# Patient Record
Sex: MALE | Race: WHITE | Employment: FULL TIME
[De-identification: names, ages, dates, MRNs, and addresses within clinical notes are randomized per-mention and may not be internally consistent; named-entity substitution may affect disease eponyms.]

---

## 2017-02-07 ENCOUNTER — OFFICE VISIT (OUTPATIENT)
Dept: UROLOGY | Facility: CLINIC | Age: 57
End: 2017-02-07

## 2017-02-07 VITALS
BODY MASS INDEX: 40.4 KG/M2 | HEART RATE: 79 BPM | WEIGHT: 257.4 LBS | DIASTOLIC BLOOD PRESSURE: 74 MMHG | SYSTOLIC BLOOD PRESSURE: 130 MMHG | HEIGHT: 67 IN | TEMPERATURE: 98.3 F

## 2017-02-07 DIAGNOSIS — N42.32 ATYPICAL SMALL ACINAR PROLIFERATION OF PROSTATE: ICD-10-CM

## 2017-02-07 DIAGNOSIS — R97.20 ELEVATED PSA: Primary | ICD-10-CM

## 2017-02-07 PROCEDURE — 99213 OFFICE O/P EST LOW 20 MIN: CPT | Performed by: UROLOGY

## 2017-02-07 ASSESSMENT — ENCOUNTER SYMPTOMS
COLOR CHANGE: 0
VOMITING: 0
NAUSEA: 0
COUGH: 1
EYE PAIN: 0
SHORTNESS OF BREATH: 0
BACK PAIN: 0
EYE REDNESS: 0
ABDOMINAL PAIN: 0
WHEEZING: 0

## 2017-03-28 ENCOUNTER — APPOINTMENT (OUTPATIENT)
Dept: GENERAL RADIOLOGY | Age: 57
End: 2017-03-28
Payer: COMMERCIAL

## 2017-03-28 ENCOUNTER — HOSPITAL ENCOUNTER (EMERGENCY)
Age: 57
Discharge: HOME | End: 2017-03-28
Attending: EMERGENCY MEDICINE
Payer: COMMERCIAL

## 2017-03-28 VITALS
SYSTOLIC BLOOD PRESSURE: 156 MMHG | WEIGHT: 260 LBS | BODY MASS INDEX: 40.81 KG/M2 | OXYGEN SATURATION: 97 % | HEIGHT: 67 IN | DIASTOLIC BLOOD PRESSURE: 86 MMHG | HEART RATE: 85 BPM | RESPIRATION RATE: 16 BRPM | TEMPERATURE: 98.3 F

## 2017-03-28 DIAGNOSIS — M79.641 RIGHT HAND PAIN: Primary | ICD-10-CM

## 2017-03-28 PROCEDURE — 99283 EMERGENCY DEPT VISIT LOW MDM: CPT

## 2017-03-28 PROCEDURE — 73130 X-RAY EXAM OF HAND: CPT

## 2017-03-28 RX ORDER — IBUPROFEN 600 MG/1
600 TABLET ORAL EVERY 6 HOURS PRN
Qty: 30 TABLET | Refills: 0 | Status: SHIPPED | OUTPATIENT
Start: 2017-03-28 | End: 2019-02-05

## 2017-03-28 ASSESSMENT — ENCOUNTER SYMPTOMS
WHEEZING: 0
BACK PAIN: 0
COUGH: 0

## 2017-03-28 ASSESSMENT — PAIN SCALES - GENERAL: PAINLEVEL_OUTOF10: 3

## 2017-03-28 ASSESSMENT — PAIN DESCRIPTION - ORIENTATION: ORIENTATION: RIGHT

## 2017-03-28 ASSESSMENT — PAIN DESCRIPTION - DESCRIPTORS: DESCRIPTORS: ACHING

## 2017-03-28 ASSESSMENT — PAIN DESCRIPTION - LOCATION: LOCATION: HAND

## 2017-06-23 ENCOUNTER — HOSPITAL ENCOUNTER (OUTPATIENT)
Age: 57
Discharge: HOME | End: 2017-06-23
Payer: COMMERCIAL

## 2017-06-23 ENCOUNTER — TELEPHONE (OUTPATIENT)
Dept: UROLOGY | Age: 57
End: 2017-06-23

## 2017-06-23 DIAGNOSIS — N42.32 ATYPICAL SMALL ACINAR PROLIFERATION OF PROSTATE: ICD-10-CM

## 2017-06-23 DIAGNOSIS — R97.20 ELEVATED PSA: ICD-10-CM

## 2017-06-23 LAB — PROSTATE SPECIFIC ANTIGEN: 5.38 UG/L

## 2017-06-23 PROCEDURE — 84153 ASSAY OF PSA TOTAL: CPT

## 2017-06-23 PROCEDURE — 36415 COLL VENOUS BLD VENIPUNCTURE: CPT

## 2017-06-29 ENCOUNTER — OFFICE VISIT (OUTPATIENT)
Dept: UROLOGY | Age: 57
End: 2017-06-29
Payer: COMMERCIAL

## 2017-06-29 VITALS — TEMPERATURE: 98.4 F | DIASTOLIC BLOOD PRESSURE: 83 MMHG | SYSTOLIC BLOOD PRESSURE: 137 MMHG | HEART RATE: 78 BPM

## 2017-06-29 DIAGNOSIS — N42.32 ATYPICAL SMALL ACINAR PROLIFERATION OF PROSTATE: ICD-10-CM

## 2017-06-29 DIAGNOSIS — R97.20 ELEVATED PSA: Primary | ICD-10-CM

## 2017-06-29 PROCEDURE — 99213 OFFICE O/P EST LOW 20 MIN: CPT | Performed by: NURSE PRACTITIONER

## 2017-06-29 ASSESSMENT — ENCOUNTER SYMPTOMS
NAUSEA: 0
SHORTNESS OF BREATH: 0
COLOR CHANGE: 0
EYE PAIN: 0
ABDOMINAL PAIN: 0
VOMITING: 0
EYE REDNESS: 0
COUGH: 0
BACK PAIN: 0
WHEEZING: 0

## 2017-12-22 ENCOUNTER — HOSPITAL ENCOUNTER (OUTPATIENT)
Age: 57
Discharge: HOME | End: 2017-12-22
Payer: COMMERCIAL

## 2017-12-22 DIAGNOSIS — R97.20 ELEVATED PSA: ICD-10-CM

## 2017-12-22 DIAGNOSIS — N42.32 ATYPICAL SMALL ACINAR PROLIFERATION OF PROSTATE: ICD-10-CM

## 2017-12-22 LAB — PROSTATE SPECIFIC ANTIGEN: 4.81 UG/L

## 2017-12-22 PROCEDURE — 84153 ASSAY OF PSA TOTAL: CPT

## 2017-12-22 PROCEDURE — 36415 COLL VENOUS BLD VENIPUNCTURE: CPT

## 2017-12-28 ENCOUNTER — OFFICE VISIT (OUTPATIENT)
Dept: UROLOGY | Age: 57
End: 2017-12-28
Payer: COMMERCIAL

## 2017-12-28 VITALS
WEIGHT: 235.8 LBS | DIASTOLIC BLOOD PRESSURE: 82 MMHG | TEMPERATURE: 98.4 F | SYSTOLIC BLOOD PRESSURE: 146 MMHG | HEIGHT: 68 IN | HEART RATE: 82 BPM | BODY MASS INDEX: 35.74 KG/M2

## 2017-12-28 DIAGNOSIS — R97.20 ELEVATED PSA: Primary | ICD-10-CM

## 2017-12-28 PROCEDURE — 99213 OFFICE O/P EST LOW 20 MIN: CPT | Performed by: NURSE PRACTITIONER

## 2017-12-28 ASSESSMENT — ENCOUNTER SYMPTOMS
BACK PAIN: 0
ABDOMINAL PAIN: 0
SHORTNESS OF BREATH: 0
COLOR CHANGE: 0
NAUSEA: 0
EYE REDNESS: 0
WHEEZING: 1
EYE PAIN: 0
VOMITING: 0
COUGH: 0

## 2017-12-28 NOTE — PROGRESS NOTES
[facility]  [facility] - [place]  [address]  [address]  [address] [facility] [address]  Dept: [address] [facility] Urology Office Note - Established    Patient:  Jean Marie Carrasco  YOB: 1960  Date: 12/28/2017    The patient is a 62 y.o. male who presents today for evaluation of the following problems:   Chief Complaint   Patient presents with    Elevated PSA     6mo psa       HPI  Here for follow up on PSA. He has had 2 Bx in the past and fusion Bx. He is having rare urgency, drinks a lot of prostate cancer. His Dad has Hx of prostate cancer in the past, Dx in his 63's. Summary of old records: N/A    Additional History: N/A    Procedures Today: N/A    Urinalysis today:  No results found for this visit on 12/28/17. Last several PSA's:  Lab Results   Component Value Date    PSA 4.81 (H) 12/22/2017    PSA 5.38 (H) 06/23/2017    PSA 4.37 (H) 12/23/2016     Last total testosterone:  No results found for: TESTOSTERONE    AUA Symptom Score (12/28/2017):   INCOMPLETE EMPTYING: How often have you had the sensation of not emptying your bladder?: Not at all  FREQUENCY: How often do you have to urinate less than every two hours?: Not at all  INTERMITTENCY: How often have you found you stopped and started again several times when you urinated?: Not at all  URGENCY: How often have you found it difficult to postpone urination?: Not at all  WEAK STREAM: How often have you had a weak urinary stream?: Not at all  STRAINING: How often have you had to strain to start  urination?: Not at all  NOCTURIA: How many times did you typically get up at night to uriniate?: NONE  TOTAL I-PSS SCORE[de-identified] 0  How would you feel if you were to spend the rest of your life with your urinary condition?: Pleased    Last BUN and creatinine:  Lab Results   Component Value Date    BUN 12 03/08/2016     Lab Results   Component Value Date    CREATININE 0.57 (L) 03/08/2016       Additional Lab/Culture results: none    Imaging Reviewed during this Office Visit: none    (results were independently reviewed by physician and radiology report verified)    PAST MEDICAL, FAMILY AND SOCIAL HISTORY UPDATE:  Past Medical History:   Diagnosis Date    Abnormal EKG 2004     Past Surgical History:   Procedure Laterality Date    CARDIAC CATHETERIZATION  2004    for abnormal EKG, No clots    KNEE SURGERY  2001    PROSTATE BIOPSY  6/10/2016    with ultrasound     Family History   Problem Relation Age of Onset    Heart Disease Mother     Prostate Cancer Father     High Blood Pressure Father     Diabetes Father     Heart Disease Father     Stroke Paternal Grandfather      No outpatient prescriptions have been marked as taking for the 12/28/17 encounter (Office Visit) with Claudio Mccain CNP. Review of patient's allergies indicates no known allergies. History   Smoking Status    Current Every Day Smoker    Packs/day: 1.00    Years: 40.00    Types: Cigarettes    Start date: 5/27/1975   Smokeless Tobacco    Never Used     (If patient a smoker, smoking cessation counseling offered)    History   Alcohol Use No       REVIEW OF SYSTEMS:  Review of Systems   Constitutional: Negative for appetite change, chills and fever. Eyes: Negative for pain, redness and visual disturbance. Respiratory: Positive for wheezing. Negative for cough and shortness of breath. Cardiovascular: Negative for chest pain and leg swelling. Gastrointestinal: Negative for abdominal pain, nausea and vomiting. Genitourinary: Negative for difficulty urinating, discharge, dysuria, flank pain, frequency, hematuria, scrotal swelling, testicular pain and urgency. Musculoskeletal: Negative for back pain, joint swelling and myalgias. Skin: Positive for rash (psoriasis). Negative for color change and wound. Neurological: Negative for dizziness, tremors and numbness. Hematological: Negative for adenopathy. Does not bruise/bleed easily. Physical Exam:      Vitals:    12/28/17 1332   BP: (!) 146/82   Pulse: 82   Temp: 98.4 °F (36.9 °C)     Body mass index is 35.85 kg/m². Patient is a 62 y.o. male in no acute distress and alert and oriented to person, place and time. Physical Exam  Constitutional: Patient in no acute distress. Neuro: Alert and oriented to person, place and time. Psych: Mood normal, affect normal  Skin: warm, pink, No rash noted  HEENT: Head: Normocephalic and atraumatic  Conjunctivae and EOM are normal. Pupils are equal, round  Nose: Normal  Right External Ear: Normal; Left External Ear: Normal  Mouth: Mucosa Moist  Neck: Supple  Lungs: Respiratory effort is normal  Cardiovascular: strong and regular. Abdomen: Soft, non-tender, non-distended. Bladder non-tender and not distended. Musculoskeletal: Normal gait and station      Assessment and Plan      1. Elevated PSA           Plan:    Decrease coffee intake- this may improve urgency    Repeat PSA in 1 yr    Call with worsening s/s. No Follow-up on file. Prescriptions Ordered:  No orders of the defined types were placed in this encounter.      Orders Placed:  Orders Placed This Encounter   Procedures    PSA, Diagnostic     Standing Status:   Future     Standing Expiration Date:   12/28/2018          Delta Guerin CNP

## 2018-01-09 ENCOUNTER — TELEPHONE (OUTPATIENT)
Dept: UROLOGY | Age: 58
End: 2018-01-09

## 2018-12-28 ENCOUNTER — HOSPITAL ENCOUNTER (OUTPATIENT)
Age: 58
Discharge: HOME | End: 2018-12-28
Payer: COMMERCIAL

## 2018-12-28 DIAGNOSIS — R97.20 ELEVATED PSA: ICD-10-CM

## 2018-12-28 LAB — PROSTATE SPECIFIC ANTIGEN: 7.9 UG/L

## 2018-12-28 PROCEDURE — 36415 COLL VENOUS BLD VENIPUNCTURE: CPT

## 2018-12-28 PROCEDURE — 84153 ASSAY OF PSA TOTAL: CPT

## 2019-01-04 ENCOUNTER — OFFICE VISIT (OUTPATIENT)
Dept: UROLOGY | Age: 59
End: 2019-01-04
Payer: COMMERCIAL

## 2019-01-04 VITALS
DIASTOLIC BLOOD PRESSURE: 76 MMHG | HEART RATE: 82 BPM | TEMPERATURE: 98.2 F | BODY MASS INDEX: 38.8 KG/M2 | WEIGHT: 256 LBS | HEIGHT: 68 IN | SYSTOLIC BLOOD PRESSURE: 132 MMHG

## 2019-01-04 DIAGNOSIS — R97.20 ELEVATED PSA: Primary | ICD-10-CM

## 2019-01-04 DIAGNOSIS — N42.32 ATYPICAL SMALL ACINAR PROLIFERATION OF PROSTATE: ICD-10-CM

## 2019-01-04 PROCEDURE — 99214 OFFICE O/P EST MOD 30 MIN: CPT | Performed by: UROLOGY

## 2019-01-04 ASSESSMENT — ENCOUNTER SYMPTOMS
EYE REDNESS: 0
BACK PAIN: 0
VOMITING: 0
COLOR CHANGE: 0
EYE PAIN: 0
SHORTNESS OF BREATH: 0
NAUSEA: 0
ABDOMINAL PAIN: 0
WHEEZING: 1
COUGH: 0

## 2019-01-08 ENCOUNTER — OFFICE VISIT (OUTPATIENT)
Dept: UROLOGY | Age: 59
End: 2019-01-08

## 2019-01-08 DIAGNOSIS — R97.20 ELEVATED PSA: Primary | ICD-10-CM

## 2019-01-08 PROCEDURE — 99999 PR OFFICE/OUTPT VISIT,PROCEDURE ONLY: CPT | Performed by: UROLOGY

## 2019-02-05 ENCOUNTER — OFFICE VISIT (OUTPATIENT)
Dept: UROLOGY | Age: 59
End: 2019-02-05
Payer: COMMERCIAL

## 2019-02-05 VITALS
TEMPERATURE: 98.4 F | BODY MASS INDEX: 38.77 KG/M2 | SYSTOLIC BLOOD PRESSURE: 138 MMHG | DIASTOLIC BLOOD PRESSURE: 82 MMHG | WEIGHT: 255.8 LBS | HEIGHT: 68 IN

## 2019-02-05 DIAGNOSIS — N40.1 BPH WITH OBSTRUCTION/LOWER URINARY TRACT SYMPTOMS: ICD-10-CM

## 2019-02-05 DIAGNOSIS — Z80.42 FAMILY HISTORY OF MALIGNANT NEOPLASM OF PROSTATE IN FATHER: ICD-10-CM

## 2019-02-05 DIAGNOSIS — R97.20 ELEVATED PSA: Primary | ICD-10-CM

## 2019-02-05 DIAGNOSIS — N13.8 BPH WITH OBSTRUCTION/LOWER URINARY TRACT SYMPTOMS: ICD-10-CM

## 2019-02-05 PROCEDURE — 99214 OFFICE O/P EST MOD 30 MIN: CPT | Performed by: UROLOGY

## 2019-02-05 ASSESSMENT — ENCOUNTER SYMPTOMS
WHEEZING: 0
NAUSEA: 0
DIARRHEA: 0
EYE REDNESS: 0
SHORTNESS OF BREATH: 0
EYE PAIN: 0
BACK PAIN: 0
CONSTIPATION: 0
VOMITING: 0
ABDOMINAL PAIN: 0
COUGH: 0
COLOR CHANGE: 0

## 2019-03-06 ENCOUNTER — HOSPITAL ENCOUNTER (INPATIENT)
Age: 59
LOS: 9 days | Discharge: HOME | DRG: 561 | End: 2019-03-15
Attending: PHYSICAL MEDICINE & REHABILITATION | Admitting: PHYSICAL MEDICINE & REHABILITATION
Payer: COMMERCIAL

## 2019-03-06 DIAGNOSIS — Z87.81 S/P LEFT HIP FRACTURE: Primary | ICD-10-CM

## 2019-03-06 PROCEDURE — 1180000000 HC REHAB R&B

## 2019-03-06 PROCEDURE — 6370000000 HC RX 637 (ALT 250 FOR IP): Performed by: PHYSICAL MEDICINE & REHABILITATION

## 2019-03-06 PROCEDURE — 99223 1ST HOSP IP/OBS HIGH 75: CPT | Performed by: PHYSICAL MEDICINE & REHABILITATION

## 2019-03-06 RX ORDER — OXYCODONE HYDROCHLORIDE AND ACETAMINOPHEN 5; 325 MG/1; MG/1
1 TABLET ORAL EVERY 6 HOURS PRN
Status: DISCONTINUED | OUTPATIENT
Start: 2019-03-06 | End: 2019-03-15 | Stop reason: HOSPADM

## 2019-03-06 RX ORDER — OXYCODONE HYDROCHLORIDE AND ACETAMINOPHEN 5; 325 MG/1; MG/1
2 TABLET ORAL EVERY 6 HOURS PRN
Status: DISCONTINUED | OUTPATIENT
Start: 2019-03-06 | End: 2019-03-15 | Stop reason: HOSPADM

## 2019-03-06 RX ORDER — NAPROXEN 250 MG/1
250 TABLET ORAL 2 TIMES DAILY WITH MEALS
Status: DISCONTINUED | OUTPATIENT
Start: 2019-03-06 | End: 2019-03-07

## 2019-03-06 RX ADMIN — OXYCODONE AND ACETAMINOPHEN 1 TABLET: 5; 325 TABLET ORAL at 21:05

## 2019-03-06 RX ADMIN — NAPROXEN 250 MG: 250 TABLET ORAL at 22:36

## 2019-03-06 ASSESSMENT — PAIN SCALES - GENERAL
PAINLEVEL_OUTOF10: 9
PAINLEVEL_OUTOF10: 5
PAINLEVEL_OUTOF10: 6
PAINLEVEL_OUTOF10: 3
PAINLEVEL_OUTOF10: 0

## 2019-03-07 LAB
ANION GAP SERPL CALCULATED.3IONS-SCNC: 7 MMOL/L (ref 9–17)
BUN BLDV-MCNC: 9 MG/DL (ref 6–20)
BUN/CREAT BLD: ABNORMAL (ref 9–20)
CALCIUM SERPL-MCNC: 8.6 MG/DL (ref 8.6–10.4)
CHLORIDE BLD-SCNC: 98 MMOL/L (ref 98–107)
CO2: 31 MMOL/L (ref 20–31)
CREAT SERPL-MCNC: 0.49 MG/DL (ref 0.7–1.2)
GFR AFRICAN AMERICAN: >60 ML/MIN
GFR NON-AFRICAN AMERICAN: >60 ML/MIN
GFR SERPL CREATININE-BSD FRML MDRD: ABNORMAL ML/MIN/{1.73_M2}
GFR SERPL CREATININE-BSD FRML MDRD: ABNORMAL ML/MIN/{1.73_M2}
GLUCOSE BLD-MCNC: 111 MG/DL (ref 70–99)
HCT VFR BLD CALC: 30.9 % (ref 41–53)
HEMOGLOBIN: 10.3 G/DL (ref 13.5–17.5)
MCH RBC QN AUTO: 30.6 PG (ref 26–34)
MCHC RBC AUTO-ENTMCNC: 33.4 G/DL (ref 31–37)
MCV RBC AUTO: 91.6 FL (ref 80–100)
NRBC AUTOMATED: ABNORMAL PER 100 WBC
PDW BLD-RTO: 13 % (ref 11.5–14.9)
PLATELET # BLD: 195 K/UL (ref 150–450)
PMV BLD AUTO: 8.9 FL (ref 6–12)
POTASSIUM SERPL-SCNC: 3.8 MMOL/L (ref 3.7–5.3)
RBC # BLD: 3.37 M/UL (ref 4.5–5.9)
SODIUM BLD-SCNC: 136 MMOL/L (ref 135–144)
WBC # BLD: 6.1 K/UL (ref 3.5–11)

## 2019-03-07 PROCEDURE — 97116 GAIT TRAINING THERAPY: CPT

## 2019-03-07 PROCEDURE — 97166 OT EVAL MOD COMPLEX 45 MIN: CPT

## 2019-03-07 PROCEDURE — 97530 THERAPEUTIC ACTIVITIES: CPT

## 2019-03-07 PROCEDURE — 36415 COLL VENOUS BLD VENIPUNCTURE: CPT

## 2019-03-07 PROCEDURE — 97162 PT EVAL MOD COMPLEX 30 MIN: CPT

## 2019-03-07 PROCEDURE — 1180000000 HC REHAB R&B

## 2019-03-07 PROCEDURE — 97110 THERAPEUTIC EXERCISES: CPT

## 2019-03-07 PROCEDURE — 99232 SBSQ HOSP IP/OBS MODERATE 35: CPT | Performed by: PHYSICAL MEDICINE & REHABILITATION

## 2019-03-07 PROCEDURE — 80048 BASIC METABOLIC PNL TOTAL CA: CPT

## 2019-03-07 PROCEDURE — 6370000000 HC RX 637 (ALT 250 FOR IP): Performed by: PHYSICAL MEDICINE & REHABILITATION

## 2019-03-07 PROCEDURE — 97535 SELF CARE MNGMENT TRAINING: CPT

## 2019-03-07 PROCEDURE — 6360000002 HC RX W HCPCS: Performed by: PHYSICAL MEDICINE & REHABILITATION

## 2019-03-07 PROCEDURE — 85027 COMPLETE CBC AUTOMATED: CPT

## 2019-03-07 RX ADMIN — OXYCODONE AND ACETAMINOPHEN 2 TABLET: 5; 325 TABLET ORAL at 13:55

## 2019-03-07 RX ADMIN — ENOXAPARIN SODIUM 30 MG: 30 INJECTION SUBCUTANEOUS at 08:28

## 2019-03-07 RX ADMIN — ENOXAPARIN SODIUM 30 MG: 30 INJECTION SUBCUTANEOUS at 20:49

## 2019-03-07 RX ADMIN — OXYCODONE AND ACETAMINOPHEN 2 TABLET: 5; 325 TABLET ORAL at 20:50

## 2019-03-07 RX ADMIN — OXYCODONE AND ACETAMINOPHEN 2 TABLET: 5; 325 TABLET ORAL at 06:36

## 2019-03-07 ASSESSMENT — PAIN DESCRIPTION - FREQUENCY
FREQUENCY: INTERMITTENT
FREQUENCY: INTERMITTENT

## 2019-03-07 ASSESSMENT — PAIN DESCRIPTION - LOCATION
LOCATION: HIP

## 2019-03-07 ASSESSMENT — PAIN SCALES - GENERAL
PAINLEVEL_OUTOF10: 5
PAINLEVEL_OUTOF10: 7
PAINLEVEL_OUTOF10: 0
PAINLEVEL_OUTOF10: 4
PAINLEVEL_OUTOF10: 2
PAINLEVEL_OUTOF10: 7
PAINLEVEL_OUTOF10: 4
PAINLEVEL_OUTOF10: 3
PAINLEVEL_OUTOF10: 7

## 2019-03-07 ASSESSMENT — PAIN DESCRIPTION - PROGRESSION
CLINICAL_PROGRESSION: GRADUALLY IMPROVING

## 2019-03-07 ASSESSMENT — PAIN DESCRIPTION - PAIN TYPE
TYPE: ACUTE PAIN;SURGICAL PAIN

## 2019-03-07 ASSESSMENT — PAIN DESCRIPTION - DESCRIPTORS
DESCRIPTORS: ACHING;DISCOMFORT
DESCRIPTORS: ACHING;DISCOMFORT
DESCRIPTORS: ACHING

## 2019-03-07 ASSESSMENT — PAIN DESCRIPTION - ORIENTATION
ORIENTATION: LEFT

## 2019-03-07 ASSESSMENT — PAIN - FUNCTIONAL ASSESSMENT: PAIN_FUNCTIONAL_ASSESSMENT: PREVENTS OR INTERFERES SOME ACTIVE ACTIVITIES AND ADLS

## 2019-03-07 ASSESSMENT — PAIN DESCRIPTION - ONSET: ONSET: ON-GOING

## 2019-03-08 PROCEDURE — 97116 GAIT TRAINING THERAPY: CPT

## 2019-03-08 PROCEDURE — 6370000000 HC RX 637 (ALT 250 FOR IP): Performed by: PHYSICAL MEDICINE & REHABILITATION

## 2019-03-08 PROCEDURE — 97530 THERAPEUTIC ACTIVITIES: CPT

## 2019-03-08 PROCEDURE — 99232 SBSQ HOSP IP/OBS MODERATE 35: CPT | Performed by: PHYSICAL MEDICINE & REHABILITATION

## 2019-03-08 PROCEDURE — 97535 SELF CARE MNGMENT TRAINING: CPT

## 2019-03-08 PROCEDURE — 97110 THERAPEUTIC EXERCISES: CPT

## 2019-03-08 PROCEDURE — 1180000000 HC REHAB R&B

## 2019-03-08 PROCEDURE — 6360000002 HC RX W HCPCS: Performed by: PHYSICAL MEDICINE & REHABILITATION

## 2019-03-08 RX ORDER — DOCUSATE SODIUM 100 MG/1
100 CAPSULE, LIQUID FILLED ORAL 2 TIMES DAILY
Status: DISCONTINUED | OUTPATIENT
Start: 2019-03-08 | End: 2019-03-14

## 2019-03-08 RX ORDER — POLYETHYLENE GLYCOL 3350 17 G/17G
17 POWDER, FOR SOLUTION ORAL DAILY PRN
Status: DISCONTINUED | OUTPATIENT
Start: 2019-03-08 | End: 2019-03-15 | Stop reason: HOSPADM

## 2019-03-08 RX ADMIN — DOCUSATE SODIUM 100 MG: 100 CAPSULE, LIQUID FILLED ORAL at 09:49

## 2019-03-08 RX ADMIN — OXYCODONE AND ACETAMINOPHEN 2 TABLET: 5; 325 TABLET ORAL at 05:38

## 2019-03-08 RX ADMIN — OXYCODONE AND ACETAMINOPHEN 1 TABLET: 5; 325 TABLET ORAL at 22:28

## 2019-03-08 RX ADMIN — OXYCODONE AND ACETAMINOPHEN 1 TABLET: 5; 325 TABLET ORAL at 11:51

## 2019-03-08 RX ADMIN — ENOXAPARIN SODIUM 30 MG: 30 INJECTION SUBCUTANEOUS at 09:49

## 2019-03-08 RX ADMIN — POLYETHYLENE GLYCOL 3350 17 G: 17 POWDER, FOR SOLUTION ORAL at 10:00

## 2019-03-08 RX ADMIN — DOCUSATE SODIUM 100 MG: 100 CAPSULE, LIQUID FILLED ORAL at 22:24

## 2019-03-08 RX ADMIN — ENOXAPARIN SODIUM 30 MG: 30 INJECTION SUBCUTANEOUS at 22:24

## 2019-03-08 ASSESSMENT — PAIN DESCRIPTION - DESCRIPTORS
DESCRIPTORS: ACHING
DESCRIPTORS: ACHING;SORE

## 2019-03-08 ASSESSMENT — PAIN SCALES - GENERAL
PAINLEVEL_OUTOF10: 0
PAINLEVEL_OUTOF10: 4
PAINLEVEL_OUTOF10: 5
PAINLEVEL_OUTOF10: 0
PAINLEVEL_OUTOF10: 5
PAINLEVEL_OUTOF10: 4
PAINLEVEL_OUTOF10: 2

## 2019-03-08 ASSESSMENT — PAIN DESCRIPTION - FREQUENCY: FREQUENCY: INTERMITTENT

## 2019-03-08 ASSESSMENT — PAIN DESCRIPTION - PAIN TYPE
TYPE: ACUTE PAIN;SURGICAL PAIN
TYPE: ACUTE PAIN;SURGICAL PAIN
TYPE: SURGICAL PAIN

## 2019-03-08 ASSESSMENT — PAIN DESCRIPTION - PROGRESSION: CLINICAL_PROGRESSION: GRADUALLY IMPROVING

## 2019-03-08 ASSESSMENT — PAIN DESCRIPTION - LOCATION
LOCATION: HIP

## 2019-03-08 ASSESSMENT — PAIN DESCRIPTION - ORIENTATION
ORIENTATION: LEFT

## 2019-03-08 ASSESSMENT — PAIN DESCRIPTION - ONSET: ONSET: ON-GOING

## 2019-03-09 PROCEDURE — 97110 THERAPEUTIC EXERCISES: CPT

## 2019-03-09 PROCEDURE — 97530 THERAPEUTIC ACTIVITIES: CPT

## 2019-03-09 PROCEDURE — 6360000002 HC RX W HCPCS: Performed by: PHYSICAL MEDICINE & REHABILITATION

## 2019-03-09 PROCEDURE — 99232 SBSQ HOSP IP/OBS MODERATE 35: CPT | Performed by: PHYSICAL MEDICINE & REHABILITATION

## 2019-03-09 PROCEDURE — 6370000000 HC RX 637 (ALT 250 FOR IP): Performed by: PHYSICAL MEDICINE & REHABILITATION

## 2019-03-09 PROCEDURE — 97116 GAIT TRAINING THERAPY: CPT

## 2019-03-09 PROCEDURE — 1180000000 HC REHAB R&B

## 2019-03-09 PROCEDURE — 97535 SELF CARE MNGMENT TRAINING: CPT

## 2019-03-09 RX ADMIN — OXYCODONE AND ACETAMINOPHEN 1 TABLET: 5; 325 TABLET ORAL at 13:31

## 2019-03-09 RX ADMIN — DOCUSATE SODIUM 100 MG: 100 CAPSULE, LIQUID FILLED ORAL at 21:22

## 2019-03-09 RX ADMIN — ENOXAPARIN SODIUM 30 MG: 30 INJECTION SUBCUTANEOUS at 07:25

## 2019-03-09 RX ADMIN — DOCUSATE SODIUM 100 MG: 100 CAPSULE, LIQUID FILLED ORAL at 07:25

## 2019-03-09 RX ADMIN — ENOXAPARIN SODIUM 30 MG: 30 INJECTION SUBCUTANEOUS at 21:22

## 2019-03-09 RX ADMIN — OXYCODONE AND ACETAMINOPHEN 2 TABLET: 5; 325 TABLET ORAL at 21:22

## 2019-03-09 RX ADMIN — OXYCODONE AND ACETAMINOPHEN 2 TABLET: 5; 325 TABLET ORAL at 07:25

## 2019-03-09 ASSESSMENT — PAIN DESCRIPTION - ONSET: ONSET: ON-GOING

## 2019-03-09 ASSESSMENT — PAIN DESCRIPTION - PROGRESSION
CLINICAL_PROGRESSION: GRADUALLY IMPROVING
CLINICAL_PROGRESSION: GRADUALLY IMPROVING

## 2019-03-09 ASSESSMENT — PAIN DESCRIPTION - DESCRIPTORS: DESCRIPTORS: ACHING

## 2019-03-09 ASSESSMENT — PAIN DESCRIPTION - FREQUENCY: FREQUENCY: INTERMITTENT

## 2019-03-09 ASSESSMENT — PAIN DESCRIPTION - PAIN TYPE
TYPE: SURGICAL PAIN
TYPE: SURGICAL PAIN

## 2019-03-09 ASSESSMENT — PAIN DESCRIPTION - ORIENTATION
ORIENTATION: LEFT
ORIENTATION: LEFT

## 2019-03-09 ASSESSMENT — PAIN SCALES - GENERAL
PAINLEVEL_OUTOF10: 6
PAINLEVEL_OUTOF10: 5
PAINLEVEL_OUTOF10: 6
PAINLEVEL_OUTOF10: 6
PAINLEVEL_OUTOF10: 0
PAINLEVEL_OUTOF10: 6

## 2019-03-09 ASSESSMENT — PAIN DESCRIPTION - LOCATION
LOCATION: HIP
LOCATION: HIP

## 2019-03-10 PROCEDURE — 97535 SELF CARE MNGMENT TRAINING: CPT

## 2019-03-10 PROCEDURE — 1180000000 HC REHAB R&B

## 2019-03-10 PROCEDURE — 97530 THERAPEUTIC ACTIVITIES: CPT

## 2019-03-10 PROCEDURE — 97116 GAIT TRAINING THERAPY: CPT

## 2019-03-10 PROCEDURE — 6370000000 HC RX 637 (ALT 250 FOR IP): Performed by: PHYSICAL MEDICINE & REHABILITATION

## 2019-03-10 PROCEDURE — 99232 SBSQ HOSP IP/OBS MODERATE 35: CPT | Performed by: PHYSICAL MEDICINE & REHABILITATION

## 2019-03-10 PROCEDURE — 97110 THERAPEUTIC EXERCISES: CPT

## 2019-03-10 PROCEDURE — 6360000002 HC RX W HCPCS: Performed by: PHYSICAL MEDICINE & REHABILITATION

## 2019-03-10 RX ADMIN — OXYCODONE AND ACETAMINOPHEN 2 TABLET: 5; 325 TABLET ORAL at 12:59

## 2019-03-10 RX ADMIN — OXYCODONE AND ACETAMINOPHEN 2 TABLET: 5; 325 TABLET ORAL at 07:19

## 2019-03-10 RX ADMIN — ENOXAPARIN SODIUM 30 MG: 30 INJECTION SUBCUTANEOUS at 20:23

## 2019-03-10 RX ADMIN — DOCUSATE SODIUM 100 MG: 100 CAPSULE, LIQUID FILLED ORAL at 20:23

## 2019-03-10 RX ADMIN — ENOXAPARIN SODIUM 30 MG: 30 INJECTION SUBCUTANEOUS at 07:19

## 2019-03-10 RX ADMIN — DOCUSATE SODIUM 100 MG: 100 CAPSULE, LIQUID FILLED ORAL at 07:19

## 2019-03-10 RX ADMIN — OXYCODONE AND ACETAMINOPHEN 2 TABLET: 5; 325 TABLET ORAL at 18:39

## 2019-03-10 ASSESSMENT — PAIN SCALES - GENERAL
PAINLEVEL_OUTOF10: 7
PAINLEVEL_OUTOF10: 5
PAINLEVEL_OUTOF10: 6
PAINLEVEL_OUTOF10: 2

## 2019-03-10 ASSESSMENT — PAIN DESCRIPTION - DESCRIPTORS: DESCRIPTORS: ACHING;TIGHTNESS;PRESSURE

## 2019-03-10 ASSESSMENT — PAIN DESCRIPTION - PROGRESSION: CLINICAL_PROGRESSION: GRADUALLY IMPROVING

## 2019-03-10 ASSESSMENT — PAIN DESCRIPTION - ORIENTATION: ORIENTATION: LEFT

## 2019-03-10 ASSESSMENT — PAIN DESCRIPTION - ONSET: ONSET: ON-GOING

## 2019-03-10 ASSESSMENT — PAIN DESCRIPTION - PAIN TYPE: TYPE: SURGICAL PAIN;ACUTE PAIN

## 2019-03-10 ASSESSMENT — PAIN DESCRIPTION - LOCATION: LOCATION: HIP

## 2019-03-11 PROCEDURE — 6370000000 HC RX 637 (ALT 250 FOR IP): Performed by: PHYSICAL MEDICINE & REHABILITATION

## 2019-03-11 PROCEDURE — 1180000000 HC REHAB R&B

## 2019-03-11 PROCEDURE — 97110 THERAPEUTIC EXERCISES: CPT

## 2019-03-11 PROCEDURE — 6360000002 HC RX W HCPCS: Performed by: PHYSICAL MEDICINE & REHABILITATION

## 2019-03-11 PROCEDURE — 97535 SELF CARE MNGMENT TRAINING: CPT

## 2019-03-11 PROCEDURE — 99232 SBSQ HOSP IP/OBS MODERATE 35: CPT | Performed by: PHYSICAL MEDICINE & REHABILITATION

## 2019-03-11 PROCEDURE — 97530 THERAPEUTIC ACTIVITIES: CPT

## 2019-03-11 PROCEDURE — 97116 GAIT TRAINING THERAPY: CPT

## 2019-03-11 RX ADMIN — DOCUSATE SODIUM 100 MG: 100 CAPSULE, LIQUID FILLED ORAL at 07:19

## 2019-03-11 RX ADMIN — ENOXAPARIN SODIUM 30 MG: 30 INJECTION SUBCUTANEOUS at 21:15

## 2019-03-11 RX ADMIN — OXYCODONE AND ACETAMINOPHEN 2 TABLET: 5; 325 TABLET ORAL at 13:14

## 2019-03-11 RX ADMIN — DOCUSATE SODIUM 100 MG: 100 CAPSULE, LIQUID FILLED ORAL at 21:15

## 2019-03-11 RX ADMIN — OXYCODONE AND ACETAMINOPHEN 2 TABLET: 5; 325 TABLET ORAL at 00:43

## 2019-03-11 RX ADMIN — OXYCODONE AND ACETAMINOPHEN 2 TABLET: 5; 325 TABLET ORAL at 07:19

## 2019-03-11 RX ADMIN — ENOXAPARIN SODIUM 30 MG: 30 INJECTION SUBCUTANEOUS at 07:19

## 2019-03-11 RX ADMIN — OXYCODONE AND ACETAMINOPHEN 2 TABLET: 5; 325 TABLET ORAL at 21:14

## 2019-03-11 ASSESSMENT — PAIN SCALES - GENERAL
PAINLEVEL_OUTOF10: 7
PAINLEVEL_OUTOF10: 0
PAINLEVEL_OUTOF10: 7
PAINLEVEL_OUTOF10: 5
PAINLEVEL_OUTOF10: 5
PAINLEVEL_OUTOF10: 8
PAINLEVEL_OUTOF10: 5
PAINLEVEL_OUTOF10: 6
PAINLEVEL_OUTOF10: 8
PAINLEVEL_OUTOF10: 6
PAINLEVEL_OUTOF10: 5

## 2019-03-11 ASSESSMENT — PAIN DESCRIPTION - ORIENTATION
ORIENTATION: LEFT

## 2019-03-11 ASSESSMENT — PAIN DESCRIPTION - PROGRESSION
CLINICAL_PROGRESSION: NOT CHANGED
CLINICAL_PROGRESSION: NOT CHANGED

## 2019-03-11 ASSESSMENT — PAIN DESCRIPTION - PAIN TYPE: TYPE: ACUTE PAIN;SURGICAL PAIN

## 2019-03-11 ASSESSMENT — PAIN DESCRIPTION - LOCATION
LOCATION: HIP

## 2019-03-11 ASSESSMENT — PAIN DESCRIPTION - ONSET: ONSET: ON-GOING

## 2019-03-11 ASSESSMENT — PAIN DESCRIPTION - FREQUENCY: FREQUENCY: CONTINUOUS

## 2019-03-11 ASSESSMENT — PAIN DESCRIPTION - DESCRIPTORS: DESCRIPTORS: ACHING;DISCOMFORT

## 2019-03-12 PROCEDURE — 97116 GAIT TRAINING THERAPY: CPT

## 2019-03-12 PROCEDURE — 97530 THERAPEUTIC ACTIVITIES: CPT

## 2019-03-12 PROCEDURE — 97110 THERAPEUTIC EXERCISES: CPT

## 2019-03-12 PROCEDURE — 97535 SELF CARE MNGMENT TRAINING: CPT

## 2019-03-12 PROCEDURE — 99232 SBSQ HOSP IP/OBS MODERATE 35: CPT | Performed by: PHYSICAL MEDICINE & REHABILITATION

## 2019-03-12 PROCEDURE — 1180000000 HC REHAB R&B

## 2019-03-12 PROCEDURE — 6370000000 HC RX 637 (ALT 250 FOR IP): Performed by: PHYSICAL MEDICINE & REHABILITATION

## 2019-03-12 PROCEDURE — 6360000002 HC RX W HCPCS: Performed by: PHYSICAL MEDICINE & REHABILITATION

## 2019-03-12 RX ADMIN — ENOXAPARIN SODIUM 30 MG: 30 INJECTION SUBCUTANEOUS at 21:44

## 2019-03-12 RX ADMIN — OXYCODONE AND ACETAMINOPHEN 2 TABLET: 5; 325 TABLET ORAL at 06:05

## 2019-03-12 RX ADMIN — ENOXAPARIN SODIUM 30 MG: 30 INJECTION SUBCUTANEOUS at 08:35

## 2019-03-12 RX ADMIN — OXYCODONE AND ACETAMINOPHEN 2 TABLET: 5; 325 TABLET ORAL at 15:45

## 2019-03-12 RX ADMIN — DOCUSATE SODIUM 100 MG: 100 CAPSULE, LIQUID FILLED ORAL at 08:35

## 2019-03-12 RX ADMIN — DOCUSATE SODIUM 100 MG: 100 CAPSULE, LIQUID FILLED ORAL at 21:44

## 2019-03-12 ASSESSMENT — PAIN DESCRIPTION - PAIN TYPE: TYPE: ACUTE PAIN;SURGICAL PAIN

## 2019-03-12 ASSESSMENT — PAIN DESCRIPTION - LOCATION: LOCATION: HIP

## 2019-03-12 ASSESSMENT — PAIN SCALES - GENERAL
PAINLEVEL_OUTOF10: 5
PAINLEVEL_OUTOF10: 6
PAINLEVEL_OUTOF10: 5
PAINLEVEL_OUTOF10: 7
PAINLEVEL_OUTOF10: 2

## 2019-03-12 ASSESSMENT — PAIN DESCRIPTION - ORIENTATION: ORIENTATION: LEFT

## 2019-03-13 PROCEDURE — 6370000000 HC RX 637 (ALT 250 FOR IP): Performed by: PHYSICAL MEDICINE & REHABILITATION

## 2019-03-13 PROCEDURE — 1180000000 HC REHAB R&B

## 2019-03-13 PROCEDURE — 6360000002 HC RX W HCPCS: Performed by: PHYSICAL MEDICINE & REHABILITATION

## 2019-03-13 PROCEDURE — 97110 THERAPEUTIC EXERCISES: CPT

## 2019-03-13 PROCEDURE — 97530 THERAPEUTIC ACTIVITIES: CPT

## 2019-03-13 PROCEDURE — 97116 GAIT TRAINING THERAPY: CPT

## 2019-03-13 PROCEDURE — 97535 SELF CARE MNGMENT TRAINING: CPT

## 2019-03-13 PROCEDURE — 99231 SBSQ HOSP IP/OBS SF/LOW 25: CPT | Performed by: PHYSICAL MEDICINE & REHABILITATION

## 2019-03-13 RX ADMIN — DOCUSATE SODIUM 100 MG: 100 CAPSULE, LIQUID FILLED ORAL at 20:49

## 2019-03-13 RX ADMIN — OXYCODONE AND ACETAMINOPHEN 1 TABLET: 5; 325 TABLET ORAL at 00:08

## 2019-03-13 RX ADMIN — OXYCODONE AND ACETAMINOPHEN 1 TABLET: 5; 325 TABLET ORAL at 06:44

## 2019-03-13 RX ADMIN — ENOXAPARIN SODIUM 30 MG: 30 INJECTION SUBCUTANEOUS at 07:04

## 2019-03-13 RX ADMIN — ENOXAPARIN SODIUM 30 MG: 30 INJECTION SUBCUTANEOUS at 20:49

## 2019-03-13 RX ADMIN — DOCUSATE SODIUM 100 MG: 100 CAPSULE, LIQUID FILLED ORAL at 07:04

## 2019-03-13 ASSESSMENT — PAIN SCALES - GENERAL
PAINLEVEL_OUTOF10: 4
PAINLEVEL_OUTOF10: 5

## 2019-03-13 ASSESSMENT — PAIN DESCRIPTION - PROGRESSION: CLINICAL_PROGRESSION: NOT CHANGED

## 2019-03-14 LAB
ABSOLUTE EOS #: 0.2 K/UL (ref 0–0.4)
ABSOLUTE IMMATURE GRANULOCYTE: ABNORMAL K/UL (ref 0–0.3)
ABSOLUTE LYMPH #: 1.9 K/UL (ref 1–4.8)
ABSOLUTE MONO #: 0.8 K/UL (ref 0.1–1.3)
ANION GAP SERPL CALCULATED.3IONS-SCNC: 10 MMOL/L (ref 9–17)
BASOPHILS # BLD: 1 % (ref 0–2)
BASOPHILS ABSOLUTE: 0 K/UL (ref 0–0.2)
BUN BLDV-MCNC: 12 MG/DL (ref 6–20)
BUN/CREAT BLD: ABNORMAL (ref 9–20)
CALCIUM SERPL-MCNC: 9 MG/DL (ref 8.6–10.4)
CHLORIDE BLD-SCNC: 101 MMOL/L (ref 98–107)
CO2: 27 MMOL/L (ref 20–31)
CREAT SERPL-MCNC: 0.55 MG/DL (ref 0.7–1.2)
DIFFERENTIAL TYPE: ABNORMAL
EOSINOPHILS RELATIVE PERCENT: 2 % (ref 0–4)
GFR AFRICAN AMERICAN: >60 ML/MIN
GFR NON-AFRICAN AMERICAN: >60 ML/MIN
GFR SERPL CREATININE-BSD FRML MDRD: ABNORMAL ML/MIN/{1.73_M2}
GFR SERPL CREATININE-BSD FRML MDRD: ABNORMAL ML/MIN/{1.73_M2}
GLUCOSE BLD-MCNC: 101 MG/DL (ref 70–99)
HCT VFR BLD CALC: 31.8 % (ref 41–53)
HEMOGLOBIN: 10.7 G/DL (ref 13.5–17.5)
IMMATURE GRANULOCYTES: ABNORMAL %
LYMPHOCYTES # BLD: 29 % (ref 24–44)
MCH RBC QN AUTO: 31.4 PG (ref 26–34)
MCHC RBC AUTO-ENTMCNC: 33.7 G/DL (ref 31–37)
MCV RBC AUTO: 93 FL (ref 80–100)
MONOCYTES # BLD: 12 % (ref 1–7)
NRBC AUTOMATED: ABNORMAL PER 100 WBC
PDW BLD-RTO: 13.3 % (ref 11.5–14.9)
PLATELET # BLD: 455 K/UL (ref 150–450)
PLATELET ESTIMATE: ABNORMAL
PMV BLD AUTO: 7.8 FL (ref 6–12)
POTASSIUM SERPL-SCNC: 4 MMOL/L (ref 3.7–5.3)
RBC # BLD: 3.42 M/UL (ref 4.5–5.9)
RBC # BLD: ABNORMAL 10*6/UL
SEG NEUTROPHILS: 56 % (ref 36–66)
SEGMENTED NEUTROPHILS ABSOLUTE COUNT: 3.8 K/UL (ref 1.3–9.1)
SODIUM BLD-SCNC: 138 MMOL/L (ref 135–144)
WBC # BLD: 6.7 K/UL (ref 3.5–11)
WBC # BLD: ABNORMAL 10*3/UL

## 2019-03-14 PROCEDURE — 6370000000 HC RX 637 (ALT 250 FOR IP): Performed by: PHYSICAL MEDICINE & REHABILITATION

## 2019-03-14 PROCEDURE — 6360000002 HC RX W HCPCS: Performed by: PHYSICAL MEDICINE & REHABILITATION

## 2019-03-14 PROCEDURE — 85025 COMPLETE CBC W/AUTO DIFF WBC: CPT

## 2019-03-14 PROCEDURE — 97110 THERAPEUTIC EXERCISES: CPT

## 2019-03-14 PROCEDURE — 97530 THERAPEUTIC ACTIVITIES: CPT

## 2019-03-14 PROCEDURE — 36415 COLL VENOUS BLD VENIPUNCTURE: CPT

## 2019-03-14 PROCEDURE — 97535 SELF CARE MNGMENT TRAINING: CPT

## 2019-03-14 PROCEDURE — 1180000000 HC REHAB R&B

## 2019-03-14 PROCEDURE — 97116 GAIT TRAINING THERAPY: CPT

## 2019-03-14 PROCEDURE — 80048 BASIC METABOLIC PNL TOTAL CA: CPT

## 2019-03-14 PROCEDURE — 99232 SBSQ HOSP IP/OBS MODERATE 35: CPT | Performed by: PHYSICAL MEDICINE & REHABILITATION

## 2019-03-14 RX ORDER — OXYCODONE HYDROCHLORIDE AND ACETAMINOPHEN 5; 325 MG/1; MG/1
1 TABLET ORAL EVERY 8 HOURS PRN
Qty: 10 TABLET | Refills: 0 | Status: SHIPPED | OUTPATIENT
Start: 2019-03-14 | End: 2019-03-17

## 2019-03-14 RX ORDER — DOCUSATE SODIUM 100 MG/1
100 CAPSULE, LIQUID FILLED ORAL 2 TIMES DAILY PRN
Status: DISCONTINUED | OUTPATIENT
Start: 2019-03-14 | End: 2019-03-15 | Stop reason: HOSPADM

## 2019-03-14 RX ORDER — PSEUDOEPHEDRINE HCL 30 MG
100 TABLET ORAL 2 TIMES DAILY PRN
COMMUNITY
Start: 2019-03-14 | End: 2019-08-06 | Stop reason: ALTCHOICE

## 2019-03-14 RX ORDER — POLYETHYLENE GLYCOL 3350 17 G/17G
17 POWDER, FOR SOLUTION ORAL DAILY PRN
Qty: 527 G | Refills: 1 | COMMUNITY
Start: 2019-03-14 | End: 2019-04-13

## 2019-03-14 RX ADMIN — DOCUSATE SODIUM 100 MG: 100 CAPSULE, LIQUID FILLED ORAL at 07:53

## 2019-03-14 RX ADMIN — OXYCODONE AND ACETAMINOPHEN 2 TABLET: 5; 325 TABLET ORAL at 12:50

## 2019-03-14 RX ADMIN — ENOXAPARIN SODIUM 30 MG: 30 INJECTION SUBCUTANEOUS at 22:04

## 2019-03-14 RX ADMIN — ENOXAPARIN SODIUM 30 MG: 30 INJECTION SUBCUTANEOUS at 07:53

## 2019-03-14 ASSESSMENT — PAIN DESCRIPTION - FREQUENCY: FREQUENCY: INTERMITTENT

## 2019-03-14 ASSESSMENT — PAIN DESCRIPTION - DESCRIPTORS
DESCRIPTORS: ACHING;DISCOMFORT
DESCRIPTORS: ACHING;DISCOMFORT

## 2019-03-14 ASSESSMENT — PAIN DESCRIPTION - LOCATION
LOCATION: HIP

## 2019-03-14 ASSESSMENT — PAIN DESCRIPTION - ORIENTATION
ORIENTATION: LEFT

## 2019-03-14 ASSESSMENT — PAIN SCALES - GENERAL
PAINLEVEL_OUTOF10: 1
PAINLEVEL_OUTOF10: 4
PAINLEVEL_OUTOF10: 0
PAINLEVEL_OUTOF10: 6
PAINLEVEL_OUTOF10: 2
PAINLEVEL_OUTOF10: 1

## 2019-03-14 ASSESSMENT — PAIN DESCRIPTION - PROGRESSION: CLINICAL_PROGRESSION: GRADUALLY IMPROVING

## 2019-03-14 ASSESSMENT — PAIN DESCRIPTION - PAIN TYPE
TYPE: ACUTE PAIN;SURGICAL PAIN

## 2019-03-14 ASSESSMENT — PAIN DESCRIPTION - ONSET: ONSET: ON-GOING

## 2019-03-15 VITALS
BODY MASS INDEX: 41.64 KG/M2 | HEART RATE: 78 BPM | HEIGHT: 67 IN | OXYGEN SATURATION: 97 % | TEMPERATURE: 97.9 F | WEIGHT: 265.3 LBS | RESPIRATION RATE: 16 BRPM | SYSTOLIC BLOOD PRESSURE: 132 MMHG | DIASTOLIC BLOOD PRESSURE: 76 MMHG

## 2019-03-15 PROCEDURE — 6370000000 HC RX 637 (ALT 250 FOR IP): Performed by: FAMILY MEDICINE

## 2019-03-15 PROCEDURE — 99238 HOSP IP/OBS DSCHRG MGMT 30/<: CPT | Performed by: PHYSICAL MEDICINE & REHABILITATION

## 2019-03-15 PROCEDURE — 6370000000 HC RX 637 (ALT 250 FOR IP): Performed by: PHYSICAL MEDICINE & REHABILITATION

## 2019-03-15 PROCEDURE — 97535 SELF CARE MNGMENT TRAINING: CPT

## 2019-03-15 PROCEDURE — 97116 GAIT TRAINING THERAPY: CPT

## 2019-03-15 PROCEDURE — 6360000002 HC RX W HCPCS: Performed by: PHYSICAL MEDICINE & REHABILITATION

## 2019-03-15 PROCEDURE — 97110 THERAPEUTIC EXERCISES: CPT

## 2019-03-15 RX ORDER — FERROUS SULFATE 325(65) MG
325 TABLET ORAL
Qty: 30 TABLET | Refills: 2 | Status: SHIPPED | OUTPATIENT
Start: 2019-03-15 | End: 2019-08-06 | Stop reason: ALTCHOICE

## 2019-03-15 RX ORDER — FERROUS SULFATE 325(65) MG
325 TABLET ORAL
Status: DISCONTINUED | OUTPATIENT
Start: 2019-03-15 | End: 2019-03-15 | Stop reason: HOSPADM

## 2019-03-15 RX ADMIN — ENOXAPARIN SODIUM 30 MG: 30 INJECTION SUBCUTANEOUS at 08:38

## 2019-03-15 RX ADMIN — FERROUS SULFATE TAB 325 MG (65 MG ELEMENTAL FE) 325 MG: 325 (65 FE) TAB at 12:35

## 2019-03-15 RX ADMIN — OXYCODONE AND ACETAMINOPHEN 1 TABLET: 5; 325 TABLET ORAL at 08:43

## 2019-03-15 ASSESSMENT — PAIN DESCRIPTION - PAIN TYPE
TYPE: ACUTE PAIN;SURGICAL PAIN
TYPE: ACUTE PAIN;SURGICAL PAIN

## 2019-03-15 ASSESSMENT — PAIN DESCRIPTION - LOCATION
LOCATION: HIP

## 2019-03-15 ASSESSMENT — PAIN DESCRIPTION - DESCRIPTORS
DESCRIPTORS: ACHING;DISCOMFORT
DESCRIPTORS: ACHING

## 2019-03-15 ASSESSMENT — PAIN DESCRIPTION - PROGRESSION: CLINICAL_PROGRESSION: GRADUALLY IMPROVING

## 2019-03-15 ASSESSMENT — PAIN DESCRIPTION - ORIENTATION
ORIENTATION: LEFT

## 2019-03-15 ASSESSMENT — PAIN SCALES - GENERAL
PAINLEVEL_OUTOF10: 3
PAINLEVEL_OUTOF10: 2
PAINLEVEL_OUTOF10: 1
PAINLEVEL_OUTOF10: 1
PAINLEVEL_OUTOF10: 3

## 2019-03-15 ASSESSMENT — PAIN DESCRIPTION - FREQUENCY: FREQUENCY: INTERMITTENT

## 2019-03-15 ASSESSMENT — PAIN DESCRIPTION - ONSET: ONSET: ON-GOING

## 2019-03-18 ENCOUNTER — HOSPITAL ENCOUNTER (OUTPATIENT)
Dept: PHYSICAL THERAPY | Age: 59
Setting detail: THERAPIES SERIES
Discharge: HOME | End: 2019-03-18
Payer: COMMERCIAL

## 2019-03-18 PROCEDURE — 97110 THERAPEUTIC EXERCISES: CPT

## 2019-03-18 PROCEDURE — 97161 PT EVAL LOW COMPLEX 20 MIN: CPT

## 2019-03-18 ASSESSMENT — PAIN SCALES - GENERAL: PAINLEVEL_OUTOF10: 1

## 2019-03-18 ASSESSMENT — PAIN DESCRIPTION - FREQUENCY: FREQUENCY: INTERMITTENT

## 2019-03-18 ASSESSMENT — PAIN DESCRIPTION - ONSET: ONSET: UNABLE TO TELL

## 2019-03-18 ASSESSMENT — PAIN DESCRIPTION - LOCATION: LOCATION: HIP

## 2019-03-18 ASSESSMENT — PAIN DESCRIPTION - DESCRIPTORS: DESCRIPTORS: PRESSURE

## 2019-03-18 ASSESSMENT — PAIN DESCRIPTION - PROGRESSION: CLINICAL_PROGRESSION: GRADUALLY IMPROVING

## 2019-03-18 ASSESSMENT — PAIN DESCRIPTION - PAIN TYPE: TYPE: ACUTE PAIN

## 2019-03-21 ENCOUNTER — HOSPITAL ENCOUNTER (OUTPATIENT)
Dept: PHYSICAL THERAPY | Age: 59
Setting detail: THERAPIES SERIES
Discharge: HOME | End: 2019-03-21
Payer: COMMERCIAL

## 2019-03-21 PROCEDURE — 97110 THERAPEUTIC EXERCISES: CPT

## 2019-03-21 ASSESSMENT — PAIN DESCRIPTION - PROGRESSION: CLINICAL_PROGRESSION: GRADUALLY IMPROVING

## 2019-03-21 ASSESSMENT — PAIN DESCRIPTION - DESCRIPTORS: DESCRIPTORS: PRESSURE

## 2019-03-21 ASSESSMENT — PAIN DESCRIPTION - PAIN TYPE: TYPE: ACUTE PAIN

## 2019-03-21 ASSESSMENT — PAIN DESCRIPTION - LOCATION: LOCATION: HIP

## 2019-03-21 ASSESSMENT — PAIN SCALES - GENERAL: PAINLEVEL_OUTOF10: 2

## 2019-03-22 ENCOUNTER — HOSPITAL ENCOUNTER (OUTPATIENT)
Dept: PHYSICAL THERAPY | Age: 59
Setting detail: THERAPIES SERIES
Discharge: HOME | End: 2019-03-22
Payer: COMMERCIAL

## 2019-03-22 PROCEDURE — 97110 THERAPEUTIC EXERCISES: CPT

## 2019-03-22 ASSESSMENT — PAIN SCALES - GENERAL: PAINLEVEL_OUTOF10: 1

## 2019-03-22 ASSESSMENT — PAIN DESCRIPTION - ONSET: ONSET: UNABLE TO TELL

## 2019-03-22 ASSESSMENT — PAIN DESCRIPTION - PAIN TYPE: TYPE: ACUTE PAIN

## 2019-03-22 ASSESSMENT — PAIN DESCRIPTION - ORIENTATION: ORIENTATION: LEFT;ANTERIOR;POSTERIOR;OUTER

## 2019-03-22 ASSESSMENT — PAIN DESCRIPTION - PROGRESSION: CLINICAL_PROGRESSION: GRADUALLY IMPROVING

## 2019-03-22 ASSESSMENT — PAIN DESCRIPTION - DESCRIPTORS: DESCRIPTORS: CONSTANT;PRESSURE

## 2019-03-22 ASSESSMENT — PAIN DESCRIPTION - LOCATION: LOCATION: HIP

## 2019-03-25 ENCOUNTER — HOSPITAL ENCOUNTER (OUTPATIENT)
Dept: PHYSICAL THERAPY | Age: 59
Setting detail: THERAPIES SERIES
Discharge: HOME | End: 2019-03-25
Payer: COMMERCIAL

## 2019-03-25 PROCEDURE — 97110 THERAPEUTIC EXERCISES: CPT

## 2019-03-27 ENCOUNTER — HOSPITAL ENCOUNTER (OUTPATIENT)
Dept: PHYSICAL THERAPY | Age: 59
Setting detail: THERAPIES SERIES
Discharge: HOME | End: 2019-03-27
Payer: COMMERCIAL

## 2019-03-27 PROCEDURE — 97110 THERAPEUTIC EXERCISES: CPT

## 2019-03-29 ENCOUNTER — HOSPITAL ENCOUNTER (OUTPATIENT)
Dept: PHYSICAL THERAPY | Age: 59
Setting detail: THERAPIES SERIES
Discharge: HOME | End: 2019-03-29
Payer: COMMERCIAL

## 2019-03-29 PROCEDURE — 97110 THERAPEUTIC EXERCISES: CPT

## 2019-04-01 ENCOUNTER — HOSPITAL ENCOUNTER (OUTPATIENT)
Dept: PHYSICAL THERAPY | Age: 59
Setting detail: THERAPIES SERIES
Discharge: HOME | End: 2019-04-01
Payer: COMMERCIAL

## 2019-04-01 PROCEDURE — 97110 THERAPEUTIC EXERCISES: CPT

## 2019-04-01 NOTE — PROGRESS NOTES
[address]   Outpatient Physical Therapy  [address] [facility]. [address]  Phone: [phone]  Fax: [phone]  Daily Progress Note    Date: 19    Patient Name: Jean Macias        MRN: [id number]  Account: [de-identified] : 1960      General Information:  Chart Reviewed: Yes  Patient assessed for rehabilitation services?: Yes  Response To Previous Treatment: Not applicable  Family / Caregiver Present: No  Referring Practitioner: Rohit Dubose MD  Referral Date : 19  Diagnosis: s/p (L) NAKIA for left hip fracture (Z87.81)  Onset Date: 19  PT Insurance Information: [employer]   Total # of Visits Approved: 18  Total # of Visits to Date: 7  No Show: 0  Canceled Appointment: 0    Subjective:  Subjective: No new complaints states he no longer uses RW. Pain:  Patient Currently in Pain: No    Objective:  Exercise 1: Gait with a straight cane 200 ft x 2   Exercise 2: Sit to stand transfers x  15 reps   Exercise 3: Supine (L) hip SLR 10 reps x 2 sets   Exercise 4: Prone (L) hip extension 10 reps x 2 sets   Exercise 5: Step Ups F/L 6 inch 20 reps with each leg each direction  Exercise 6: Standing Squats 10 reps   Exercise 7: Standing Heel/Toe Raises 20 reps with each direction   Exercise 9: SLS on L LE only 30 second hold x 5 reps   Exercise 10: Total gym L10 Squats 15 reps     Assessment: Body structures, Functions, Activity limitations: Decreased functional mobility ; Decreased ADL status; Decreased ROM; Decreased strength  Treatment Diagnosis: (L) hip pain with motor impairment  Prognosis: Excellent  Activity Tolerance: Patient Tolerated treatment well    Plan:  Plan: Continue with current plan    Therapy Time:  Time In: 1215  Time Out: 1300  Minutes: 45  Timed Code Treatment Minutes: 45 Minutes    Treatment Charges: Minutes Units   []  Ultrasound     []  Electrical-Stim     []  Iontophoresis     []  Traction     []  Massage       []  Eval     []  Gait     []  Vasopneumatic Device [x]  Ther Exercise 45  3   []  Manual Therapy       []  Ther Activities       []  Aquatics     []  Neuro Re-Ed       []  Other       Total Treatment Time: 39 3       Janet See, PTA

## 2019-04-03 ENCOUNTER — HOSPITAL ENCOUNTER (OUTPATIENT)
Dept: PHYSICAL THERAPY | Age: 59
Setting detail: THERAPIES SERIES
Discharge: HOME | End: 2019-04-03
Payer: COMMERCIAL

## 2019-04-03 PROCEDURE — 97110 THERAPEUTIC EXERCISES: CPT

## 2019-04-03 ASSESSMENT — PAIN DESCRIPTION - PAIN TYPE: TYPE: ACUTE PAIN

## 2019-04-03 ASSESSMENT — PAIN DESCRIPTION - FREQUENCY: FREQUENCY: INTERMITTENT

## 2019-04-03 ASSESSMENT — PAIN SCALES - GENERAL: PAINLEVEL_OUTOF10: 1

## 2019-04-03 ASSESSMENT — PAIN DESCRIPTION - DESCRIPTORS: DESCRIPTORS: PRESSURE

## 2019-04-03 ASSESSMENT — PAIN DESCRIPTION - LOCATION: LOCATION: HIP

## 2019-04-03 ASSESSMENT — PAIN DESCRIPTION - ONSET: ONSET: UNABLE TO TELL

## 2019-04-03 ASSESSMENT — PAIN DESCRIPTION - ORIENTATION: ORIENTATION: LEFT;ANTERIOR;POSTERIOR;OUTER

## 2019-04-03 NOTE — PROGRESS NOTES
Physical Therapy  Daily Treatment Note  Date: 4/3/2019  Patient Name: Suni Bajwa  MRN: [id number]     :   1960    General  Chart Reviewed: Yes  Response To Previous Treatment: Not applicable  Family / Caregiver Present: No  Referring Practitioner: Kirsten Mesa MD  PT Visit Information  Onset Date: 19  PT Insurance Information: [employer]   Total # of Visits Approved: 18  Total # of Visits to Date: 8  No Show: 0  Canceled Appointment: 0    Subjective  Pain Screening  Patient Currently in Pain: Yes  Pain Assessment  Pain Assessment: 0-10  Pain Level: 1  Patient's Stated Pain Goal: No pain  Pain Type: Acute pain  Pain Location: Hip  Pain Orientation: Left; Anterior;Posterior; Outer  Pain Descriptors: Pressure  Pain Frequency: Intermittent  Pain Onset: Unable to tell  Non-Pharmaceutical Pain Intervention(s): Repositioned;Rest;Cold applied  Response to Pain Intervention: Patient Satisfied  Multiple Pain Sites: No     Treatment Activities  Exercises  Exercise 2: Sit to stand transfers x  10 reps   Exercise 3: Supine (L) hip SLR 10 reps x 3 sets   Exercise 4: Prone (L) hip extension 10 reps x 3 sets   Exercise 5: Step Ups F/L 6 inch 20 reps with each leg each direction  Exercise 6: Standing Squats 10 reps   Exercise 7: Standing Heel/Toe Raises 20 reps with each direction   Exercise 9: SLS on L LE only 30 second hold x 5 reps   Exercise 10: Total gym L10 Squats 15 reps     Activity Tolerance  Patient tolerated treatment well     Assessment  Conditions Requiring Skilled Therapeutic Intervention  Body structures, Functions, Activity limitations: Decreased functional mobility ; Decreased ADL status; Decreased ROM; Decreased strength  Treatment Diagnosis: (L) hip pain with motor impairment  Prognosis: Excellent    Plan  Times per week: 3  Plan weeks: 6  Current Treatment Recommendations: Strengthening, Patient/Caregiver Education & Training, Pain Management, Home Exercise Program, Gait Training, Equipment

## 2019-04-05 ENCOUNTER — HOSPITAL ENCOUNTER (OUTPATIENT)
Dept: PHYSICAL THERAPY | Age: 59
Setting detail: THERAPIES SERIES
Discharge: HOME | End: 2019-04-05
Payer: COMMERCIAL

## 2019-04-05 PROCEDURE — 97110 THERAPEUTIC EXERCISES: CPT

## 2019-04-05 NOTE — PROGRESS NOTES
[address]   Outpatient Physical Therapy  [address] [facility]. [address]  Phone: [phone]  Fax: [phone]  Daily Progress Note    Date: 19    Patient Name: Radha Leyva        MRN: [id number]  Account: [de-identified] : 1960      General Information:  Chart Reviewed: Yes  Patient assessed for rehabilitation services?: Yes  Response To Previous Treatment: Not applicable  Family / Caregiver Present: No  Referring Practitioner: Miguel Long MD  Referral Date : 19  Diagnosis: s/p (L) NAKIA for left hip fracture (Z87.81)  Follows Commands: Within Functional Limits  Onset Date: 19  PT Insurance Information: Cigna   Total # of Visits Approved: 18  Total # of Visits to Date: 9  No Show: 0  Canceled Appointment: 0    Subjective:  Subjective: No complaints of hip pain but states R shoulder is very swollen and painful this date since last treatment, patient reports he must have been using his UE's more than he realized causing some irritation. Pain:  Patient Currently in Pain: Denies  Response to Pain Intervention: Patient Satisfied     Objective:  Exercise 2: Sit to stand transfers x  10 reps   Exercise 3: Supine (L) hip SLR 10 reps x 3 sets   Exercise 4: Prone (L) hip extension 10 reps x 3 sets   Exercise 5: Step Ups F/L 6 inch 20 reps with each leg each direction  Exercise 6: Standing Squats 10 reps   Exercise 7: Standing Heel/Toe Raises 20 reps with each direction   Exercise 9: SLS on L LE only 30 second hold x 5 reps   Exercise 10: Total gym L10 Squats 15 reps     Assessment: Body structures, Functions, Activity limitations: Decreased functional mobility ; Decreased ADL status; Decreased ROM; Decreased strength  Assessment: limited with ambulating this date due to shoulder pain, causing patient to feel unstable.   Treatment Diagnosis: (L) hip pain with motor impairment  Activity Tolerance: Patient limited by pain    Plan:  Plan: Continue with current plan    Therapy Time:  Time In: 1115  Time Out: 1200  Minutes: 45  Timed Code Treatment Minutes: 45 Minutes    Treatment Charges: Minutes Units   []  Ultrasound     []  Electrical-Stim     []  Iontophoresis     []  Traction     []  Massage       []  Eval     []  Gait     []  Vasopneumatic Device     [x]  Ther Exercise 45  3   []  Manual Therapy       []  Ther Activities       []  Aquatics     []  Neuro Re-Ed       []  Other       Total Treatment Time: 39 3       Segundo Napoles, PTA

## 2019-04-08 ENCOUNTER — HOSPITAL ENCOUNTER (OUTPATIENT)
Dept: PHYSICAL THERAPY | Age: 59
Setting detail: THERAPIES SERIES
Discharge: HOME | End: 2019-04-08
Payer: COMMERCIAL

## 2019-04-08 PROCEDURE — 97110 THERAPEUTIC EXERCISES: CPT

## 2019-04-08 NOTE — PROGRESS NOTES
[address]   Outpatient Physical Therapy  [address] [facility]. [address]  Phone: [phone]  Fax: [phone]  Daily Progress Note    Date: 19    Patient Name: Linn Shirley        MRN: [id number]  Account: [de-identified] : 1960      General Information:  Chart Reviewed: Yes  Patient assessed for rehabilitation services?: Yes  Response To Previous Treatment: Not applicable  Family / Caregiver Present: No  Referring Practitioner: Vimal Bradshaw MD  Referral Date : 19  Diagnosis: s/p (L) NAKIA for left hip fracture (Z87.81)  Follows Commands: Within Functional Limits  Onset Date: 19  PT Insurance Information: Cigna   Total # of Visits Approved: 18  Total # of Visits to Date: 10  No Show: 0  Canceled Appointment: 0    Subjective:  Subjective: No complaints of hip pain but states R shoulder is very swollen and painful this date since last treatment, patient reports he must have been using his UE's more than he realized causing some irritation. Pain:  Patient Currently in Pain: Denies  Response to Pain Intervention: Patient Satisfied       Objective:  Exercise 2: Sit to stand transfers x  10 reps   Exercise 3: Supine (L) hip SLR 10 reps x 3 sets   Exercise 4: Prone (L) hip extension 10 reps x 3 sets   Exercise 5: Step Ups F/L 6 inch 20 reps for 2 sets  with each leg each direction  Exercise 6: Standing Squats 10 reps for 3 sets   Exercise 7: Standing Heel/Toe Raises 20 for 2 sets  reps with each direction   Exercise 9: SLS on L LE only 30 second hold x 5 reps   Exercise 10: Total gym L10 Squats/heel toe raises  15 reps for 2 sets each        Assessment: Body structures, Functions, Activity limitations: Decreased functional mobility ; Decreased ADL status; Decreased ROM; Decreased strength  Assessment: Patient frequently noted feeling stronger this date, all exercises becoming slightly easier and taking less time to complete.   Treatment Diagnosis: (L) hip pain with motor impairment  Activity Tolerance: Patient limited by pain    Plan:  Plan: Continue with current plan    Therapy Time:  Time In: 1215  Time Out: 1300  Minutes: 45  Timed Code Treatment Minutes: 45 Minutes    Treatment Charges: Minutes Units   []  Ultrasound     []  Electrical-Stim     []  Iontophoresis     []  Traction     []  Massage       []  Eval     []  Gait     []  Vasopneumatic Device     [x]  Ther Exercise 45  3   []  Manual Therapy       []  Ther Activities       []  Aquatics     []  Neuro Re-Ed       []  Other       Total Treatment Time: 39 3       Princess Patel, KATHARINA

## 2019-04-10 ENCOUNTER — HOSPITAL ENCOUNTER (OUTPATIENT)
Dept: PHYSICAL THERAPY | Age: 59
Setting detail: THERAPIES SERIES
Discharge: HOME | End: 2019-04-10
Payer: COMMERCIAL

## 2019-04-10 PROCEDURE — 97110 THERAPEUTIC EXERCISES: CPT

## 2019-04-10 ASSESSMENT — PAIN DESCRIPTION - FREQUENCY: FREQUENCY: INTERMITTENT

## 2019-04-10 ASSESSMENT — PAIN DESCRIPTION - DESCRIPTORS: DESCRIPTORS: PRESSURE

## 2019-04-10 ASSESSMENT — PAIN DESCRIPTION - LOCATION: LOCATION: HIP

## 2019-04-10 ASSESSMENT — PAIN SCALES - GENERAL: PAINLEVEL_OUTOF10: 1

## 2019-04-10 ASSESSMENT — PAIN DESCRIPTION - ONSET: ONSET: UNABLE TO TELL

## 2019-04-10 ASSESSMENT — PAIN DESCRIPTION - PAIN TYPE: TYPE: ACUTE PAIN

## 2019-04-10 ASSESSMENT — PAIN DESCRIPTION - ORIENTATION: ORIENTATION: LEFT;ANTERIOR;POSTERIOR;OUTER

## 2019-04-10 NOTE — PROGRESS NOTES
Physical Therapy  Daily Treatment Note  Date: 4/10/2019  Patient Name: Abram Bernal  MRN: [id number]     :   1960    General  Chart Reviewed: Yes  Response To Previous Treatment: Not applicable  Family / Caregiver Present: No  Referring Practitioner: Isael Chapa MD  PT Visit Information  Onset Date: 19  PT Insurance Information: Ubaldo   Total # of Visits Approved: 18  Total # of Visits to Date: 11  No Show: 0  Canceled Appointment: 0    Subjective  Pain Screening  Patient Currently in Pain: Yes  Pain Assessment  Pain Assessment: 0-10  Pain Level: 1  Patient's Stated Pain Goal: No pain  Pain Type: Acute pain  Pain Location: Hip  Pain Orientation: Left; Anterior;Posterior; Outer  Pain Descriptors: Pressure  Pain Frequency: Intermittent  Pain Onset: Unable to tell  Non-Pharmaceutical Pain Intervention(s): Repositioned;Rest;Cold applied  Response to Pain Intervention: Patient Satisfied  Multiple Pain Sites: No     Treatment Activities  Exercises  Exercise 1: Gait without a device 200 ft   Exercise 2: Sit to stand transfers x  10 reps   Exercise 3: Supine (L) hip SLR 10 reps x 3 sets   Exercise 4: Prone (L) hip extension 10 reps x 3 sets   Exercise 5: Step Ups F/L 8 inch 20 reps for 2 sets  with each leg each direction  Exercise 9: SLS on L LE only 30 second hold x 5 reps   Exercise 10: Total gym L10 Squats/heel toe raises  15 reps for 2 sets each     Activity Tolerance  Patient tolerated treatment well     Assessment  Conditions Requiring Skilled Therapeutic Intervention  Body structures, Functions, Activity limitations: Decreased functional mobility ; Decreased ADL status; Decreased ROM; Decreased strength  Treatment Diagnosis: (L) hip pain with motor impairment  Prognosis: Excellent    Plan  Times per week: 3  Plan weeks: 6  Current Treatment Recommendations: Strengthening, Patient/Caregiver Education & Training, Pain Management, Home Exercise Program, Gait Training, Equipment Evaluation, Education, & procurement, Neuromuscular Re-education    Therapy Time   Individual Concurrent Group Co-treatment   Time In 1430         Time Out 1515         Minutes 45           Treatment Charges: Minutes Units   []  Ultrasound     []  Electrical-Stim     []  Iontophoresis     []  Traction     []  Massage       []  Eval     []  Gait     [x]  Ther Exercise 45  3    []  Manual Therapy       []  Ther Activities       []  Aquatics     []  Vasopneumatic Device     []  Neuro Re-Ed       []  Other       Total Treatment Time: 39 3      Anthony Kate, PT DPT

## 2019-04-12 ENCOUNTER — HOSPITAL ENCOUNTER (OUTPATIENT)
Dept: PHYSICAL THERAPY | Age: 59
Setting detail: THERAPIES SERIES
Discharge: HOME | End: 2019-04-12
Payer: COMMERCIAL

## 2019-04-12 PROCEDURE — 97110 THERAPEUTIC EXERCISES: CPT

## 2019-04-12 ASSESSMENT — PAIN DESCRIPTION - ORIENTATION: ORIENTATION: LEFT;ANTERIOR;POSTERIOR

## 2019-04-12 ASSESSMENT — PAIN DESCRIPTION - LOCATION: LOCATION: HIP

## 2019-04-12 ASSESSMENT — PAIN SCALES - GENERAL: PAINLEVEL_OUTOF10: 1

## 2019-04-12 ASSESSMENT — PAIN DESCRIPTION - PAIN TYPE: TYPE: ACUTE PAIN

## 2019-04-12 NOTE — PROGRESS NOTES
1330  Time Out: 1415  Minutes: 45  Timed Code Treatment Minutes: 45 Minutes    Treatment Charges: Minutes Units   []  Ultrasound     []  Electrical-Stim     []  Iontophoresis     []  Traction     []  Massage       []  Eval     []  Gait     []  Vasopneumatic Device     [x]  Ther Exercise 45  3   []  Manual Therapy       []  Ther Activities       []  Aquatics     []  Neuro Re-Ed       []  Other       Total Treatment Time: 39 3       Evan Dean, PTA

## 2019-04-15 ENCOUNTER — HOSPITAL ENCOUNTER (OUTPATIENT)
Dept: PHYSICAL THERAPY | Age: 59
Setting detail: THERAPIES SERIES
Discharge: HOME | End: 2019-04-15
Payer: COMMERCIAL

## 2019-04-15 PROCEDURE — 97110 THERAPEUTIC EXERCISES: CPT

## 2019-04-15 ASSESSMENT — PAIN DESCRIPTION - LOCATION: LOCATION: HIP

## 2019-04-15 ASSESSMENT — PAIN DESCRIPTION - PAIN TYPE: TYPE: ACUTE PAIN

## 2019-04-15 ASSESSMENT — PAIN DESCRIPTION - FREQUENCY: FREQUENCY: INTERMITTENT

## 2019-04-15 ASSESSMENT — PAIN DESCRIPTION - ONSET: ONSET: UNABLE TO TELL

## 2019-04-15 ASSESSMENT — PAIN SCALES - GENERAL: PAINLEVEL_OUTOF10: 1

## 2019-04-15 ASSESSMENT — PAIN DESCRIPTION - PROGRESSION: CLINICAL_PROGRESSION: NOT CHANGED

## 2019-04-15 ASSESSMENT — PAIN DESCRIPTION - ORIENTATION: ORIENTATION: LEFT;ANTERIOR;POSTERIOR

## 2019-04-15 ASSESSMENT — PAIN DESCRIPTION - DESCRIPTORS: DESCRIPTORS: PRESSURE

## 2019-04-15 NOTE — PROGRESS NOTES
Physical Therapy  Daily Treatment Note  Date: 4/15/2019  Patient Name: Radha Leyva  MRN: [id number]     :   1960    General  Chart Reviewed: Yes  Response To Previous Treatment: Not applicable  Family / Caregiver Present: No  Referring Practitioner: Miguel Long MD  PT Visit Information  Onset Date: 19  PT Insurance Information: Ubaldo   Total # of Visits Approved: 18  Total # of Visits to Date: 13  No Show: 0  Canceled Appointment: 0    Subjective  Pain Screening  Patient Currently in Pain: Yes  Pain Assessment  Pain Assessment: 0-10  Pain Level: 1  Patient's Stated Pain Goal: No pain  Pain Type: Acute pain  Pain Location: Hip  Pain Orientation: Left; Anterior;Posterior  Pain Descriptors: Pressure  Pain Frequency: Intermittent  Pain Onset: Unable to tell  Clinical Progression: Not changed  Non-Pharmaceutical Pain Intervention(s): Repositioned;Rest;Cold applied  Response to Pain Intervention: Patient Satisfied  Multiple Pain Sites: No    Treatment Activities  Exercises  Exercise 1: Standing Gastroc Stretch 30 sec hold x 5 reps   Exercise 2: Step Ups F/L 10 inch 10 reps with each direction (L) LE only   Exercise 3: Standing 2 way hip 10 lbs (flexion and extension) 20 reps with each leg each direction  Exercise 4: Total Gym L10 Squats and Heel Raises 20 reps x  2 sets with each one  Exercise 5: Hamstring Curl 35 lbs 20 reps x 2 sets     Activity Tolerance  Patient tolerated treatment well     Assessment  Conditions Requiring Skilled Therapeutic Intervention  Body structures, Functions, Activity limitations: Decreased functional mobility ; Decreased ADL status; Decreased ROM; Decreased strength  Assessment: Able to progress with (L) hip/LE strengthening - added closed chain. Demonstrated well with min cues.    Treatment Diagnosis: (L) hip pain with motor impairment    Plan  Times per week: 3  Plan weeks: 6  Current Treatment Recommendations: Strengthening, Patient/Caregiver Education & Training, Pain

## 2019-04-17 ENCOUNTER — HOSPITAL ENCOUNTER (OUTPATIENT)
Dept: PHYSICAL THERAPY | Age: 59
Setting detail: THERAPIES SERIES
Discharge: HOME | End: 2019-04-17
Payer: COMMERCIAL

## 2019-04-17 PROCEDURE — 97110 THERAPEUTIC EXERCISES: CPT

## 2019-04-17 ASSESSMENT — PAIN DESCRIPTION - PAIN TYPE: TYPE: ACUTE PAIN

## 2019-04-17 ASSESSMENT — PAIN DESCRIPTION - ONSET: ONSET: UNABLE TO TELL

## 2019-04-17 ASSESSMENT — PAIN DESCRIPTION - DESCRIPTORS: DESCRIPTORS: PRESSURE

## 2019-04-17 ASSESSMENT — PAIN DESCRIPTION - PROGRESSION: CLINICAL_PROGRESSION: GRADUALLY WORSENING

## 2019-04-17 ASSESSMENT — PAIN DESCRIPTION - ORIENTATION: ORIENTATION: LEFT;ANTERIOR;POSTERIOR

## 2019-04-17 ASSESSMENT — PAIN DESCRIPTION - FREQUENCY: FREQUENCY: INTERMITTENT

## 2019-04-17 ASSESSMENT — PAIN SCALES - GENERAL: PAINLEVEL_OUTOF10: 2

## 2019-04-17 ASSESSMENT — PAIN DESCRIPTION - LOCATION: LOCATION: HIP

## 2019-04-18 ENCOUNTER — HOSPITAL ENCOUNTER (OUTPATIENT)
Dept: PHYSICAL THERAPY | Age: 59
Setting detail: THERAPIES SERIES
Discharge: HOME | End: 2019-04-18
Payer: COMMERCIAL

## 2019-04-18 PROCEDURE — 97110 THERAPEUTIC EXERCISES: CPT

## 2019-04-18 NOTE — PROGRESS NOTES
Physical Therapy  Daily Treatment Note  Date: 2019  Patient Name: Silver Salgado  MRN: [id number]     :   1960    General  Chart Reviewed: Yes  Response To Previous Treatment: Not applicable  Family / Caregiver Present: No  Referring Practitioner: William Cristobal MD  PT Visit Information  Onset Date: 19  PT Insurance Information: Ubaldo   Total # of Visits Approved: 18  Total # of Visits to Date: 15  No Show: 0  Canceled Appointment: 0  General Comment  Comments: History of (L) hip pain due a (L) NAKIA that was performed on 2019 @ [facility] ( L hip fracture due to falling down a full flight of steps). Acute Rehab x approx 10 days. Discharged 03/15/2019. Outpt PT ordered. Subjective  Pain Screening  Patient Currently in Pain: No  Pain Assessment  Multiple Pain Sites: No     Treatment Activities  Exercises  Exercise 1: Standing Gastroc Stretch 30 sec hold x 5 reps   Exercise 2: Step Ups F/L 10 inch 10 reps x 2 sets  with each direction (L) LE only   Exercise 3: Standing 2 way hip  20 lbs (flexion and extension) 20 reps x 2 sets with each leg each direction  Exercise 4: Total Gym L10 Squats and Heel Raises 20 reps x  2 sets with each one  Exercise 5: Hamstring Curl 35 lbs 20 reps x 2 sets   Exercise 9: SLS on L LE only 30 second hold x 5 reps     Activity Tolerance  Patient tolerated treatment well     Assessment  Conditions Requiring Skilled Therapeutic Intervention  Body structures, Functions, Activity limitations: Decreased functional mobility ; Decreased ADL status; Decreased ROM; Decreased strength  Treatment Diagnosis: (L) hip pain with motor impairment  Prognosis: Excellent    Plan  Times per week: 3  Plan weeks: 6  Current Treatment Recommendations: Strengthening, Patient/Caregiver Education & Training, Pain Management, Home Exercise Program, Gait Training, Equipment Evaluation, Education, & procurement, Neuromuscular Re-education    Therapy Time   Individual Concurrent Group

## 2019-04-22 ENCOUNTER — HOSPITAL ENCOUNTER (OUTPATIENT)
Dept: PHYSICAL THERAPY | Age: 59
Setting detail: THERAPIES SERIES
Discharge: HOME | End: 2019-04-22
Payer: COMMERCIAL

## 2019-04-22 PROCEDURE — 97110 THERAPEUTIC EXERCISES: CPT

## 2019-04-24 ENCOUNTER — HOSPITAL ENCOUNTER (OUTPATIENT)
Dept: PHYSICAL THERAPY | Age: 59
Setting detail: THERAPIES SERIES
Discharge: HOME | End: 2019-04-24
Payer: COMMERCIAL

## 2019-04-24 PROCEDURE — 97110 THERAPEUTIC EXERCISES: CPT

## 2019-04-25 NOTE — PROGRESS NOTES
Physical Therapy  Daily Treatment Note  Date: 2019  Patient Name: Carmel Max  MRN: [id number]     :   1960    General  Chart Reviewed: Yes  Response To Previous Treatment: Not applicable  Family / Caregiver Present: No  Referring Practitioner: Dariusz Franklin MD  PT Visit Information  Onset Date: 19  PT Insurance Information: Ubaldo   Total # of Visits Approved: 18  Total # of Visits to Date: 17  No Show: 0  Canceled Appointment: 0    Subjective  Pt. stated that he has had a little pressure in the hip, but is not painful  Pain Screening  Patient Currently in Pain: No  Pain Assessment  Multiple Pain Sites: No     Treatment Activities  Exercises  Exercise 1: Standing Gastroc Stretch 30 sec hold x 5 reps   Exercise 2: Step Ups F/L 10 inch 10 reps x 2 sets  with each direction (L) LE only   Exercise 3: Standing 2 way hip  20 lbs (flexion and extension) 20 reps x 2 sets with each leg each direction  Exercise 4: Total Gym L10 Squats and Heel Raises 20 reps x  2 sets with each one  Exercise 5: Hamstring Curl 35 lbs 20 reps x 2 sets   Exercise 6: Standing hip flexion/extension 7# 10x2  Exercise 7: Standing Heel/Toe Raises 20 for 2 sets  reps with each direction   Exercise 8: standing heel/toe raises 10x2   Exercise 9: SLS on L LE only 30 second hold x 5 reps   Exercise 10: Total gym L10 Squats/heel toe raises  15 reps for 2 sets each     Activity Tolerance  Patient tolerated treatment well     Assessment  Conditions Requiring Skilled Therapeutic Intervention  Body structures, Functions, Activity limitations: Decreased functional mobility ; Decreased ADL status; Decreased ROM; Decreased strength  Treatment Diagnosis: (L) hip pain with motor impairment  Prognosis: Excellent     Plan  Times per week: 3  Plan weeks: 6  Current Treatment Recommendations: Strengthening, Patient/Caregiver Education & Training, Pain Management, Home Exercise Program, Gait Training, Equipment Evaluation, Education, & procurement, Neuromuscular Re-education    Therapy Time   Individual Concurrent Group Co-treatment   Time In 3220         Time Out 1300         Minutes 45           Treatment Charges: Minutes Units   []  Ultrasound     []  Electrical-Stim     []  Iontophoresis     []  Traction     []  Massage       []  Eval     []  Gait     [x]  Ther Exercise 45  3    []  Manual Therapy       []  Ther Activities       []  Aquatics     []  Vasopneumatic Device     []  Neuro Re-Ed       []  Other       Total Treatment Time: 39  3     Jordana Drake PT DPT

## 2019-04-26 ENCOUNTER — HOSPITAL ENCOUNTER (OUTPATIENT)
Dept: PHYSICAL THERAPY | Age: 59
Setting detail: THERAPIES SERIES
Discharge: HOME | End: 2019-04-26
Payer: COMMERCIAL

## 2019-04-26 PROCEDURE — 97110 THERAPEUTIC EXERCISES: CPT

## 2019-04-26 NOTE — PROGRESS NOTES
Physical Therapy  Daily Treatment Note  Date: 2019  Patient Name: aMdyson Nobles  MRN: [id number]     :   1960    General  Chart Reviewed: Yes  Response To Previous Treatment: Not applicable  Family / Caregiver Present: No  Referring Practitioner: Ana Lilia Silver MD  PT Visit Information  Onset Date: 19  PT Insurance Information: Ubal[employer]   Total # of Visits Approved: 36  Total # of Visits to Date: 18  No Show: 0  Canceled Appointment: 0  General Comment  Comments: Received new orders from his physician to continue physical therapy for an additional 4-6 weeks. 2-3 x a week. Pt. is now able to start working on hip abduction per doctor's orders. Subjective:  Pain Screening  Patient Currently in Pain: No     Treatment Activities  Exercises  Exercise 1: Standing Gastroc Stretch 30 sec hold x 5 reps   Exercise 2: Step Ups F/L 10 inch 10 reps x 2 sets  with each direction (L) LE only   Exercise 3: Standing 2 way hip  20 lbs (flexion and extension) 20 reps x 2 sets with each leg each direction  Exercise 4: Total Gym L10 Squats and Heel Raises 20 reps x  2 sets with each one  Exercise 5: Hamstring Curl 35 lbs 20 reps x 2 sets   Exercise 6: Standing hip flexion/extension 7# 10x2  Exercise 7: Standing Heel/Toe Raises 20 for 2 sets  reps with each direction   Exercise 8: standing heel/toe raises 10x2   Exercise 9: SLS on L LE only 30 second hold x 5 reps   Exercise 10: Total gym L10 Squats/heel toe raises  15 reps for 2 sets each     Activity Tolerance  Patient Tolerated treatment well. Assessment  Conditions Requiring Skilled Therapeutic Intervention  Body structures, Functions, Activity limitations: Decreased functional mobility ; Decreased ADL status; Decreased ROM; Decreased strength  Treatment Diagnosis: (L) hip pain with motor impairment  Prognosis: Excellent     Plan  Times per week: 3  Plan weeks: 6  Current Treatment Recommendations: Strengthening, Patient/Caregiver Education & Training, Pain Management, Home Exercise Program, Gait Training, Equipment Evaluation, Education, & procurement, Neuromuscular Re-education     Therapy Time   Individual Concurrent Group Co-treatment   Time In 8790         Time Out 1300         Minutes 45           Treatment Charges: Minutes Units   []  Ultrasound     []  Electrical-Stim     []  Iontophoresis     []  Traction     []  Massage       []  Eval     []  Gait     [x]  Ther Exercise 45  3    []  Manual Therapy       []  Ther Activities       []  Aquatics     []  Vasopneumatic Device     []  Neuro Re-Ed       []  Other       Total Treatment Time: 39 3      Yulissa Ness, Student PT

## 2019-04-29 ENCOUNTER — HOSPITAL ENCOUNTER (OUTPATIENT)
Dept: PHYSICAL THERAPY | Age: 59
Setting detail: THERAPIES SERIES
Discharge: HOME | End: 2019-04-29
Payer: COMMERCIAL

## 2019-04-29 PROCEDURE — 97110 THERAPEUTIC EXERCISES: CPT

## 2019-04-29 NOTE — PROGRESS NOTES
Physical Therapy  [address]   Outpatient Physical Therapy  [address] [facility]. [address]  Phone: [phone]  Fax: [phone]  Daily Progress Note    Date: 19    Patient Name: Author Smith        MRN: [id number]  Account: [de-identified] : 1960      General Information:  Chart Reviewed: Yes  Patient assessed for rehabilitation services?: Yes  Response To Previous Treatment: Not applicable  Family / Caregiver Present: No  Referring Practitioner: Jeffy Palomino MD  Referral Date : 19  Diagnosis: s/p (L) NAKIA for left hip fracture (Z87.81)  Follows Commands: Within Functional Limits  Onset Date: 19  PT Insurance Information: [employer]   Total # of Visits Approved: 36  Total # of Visits to Date:   No Show: 0  Canceled Appointment: 0    Subjective  No new complaints. trying to work on normalizing gait. Pain  Patient Currently in Pain: No    Objective  Exercise 1: Standing Gastroc Stretch 30 sec hold x 5 reps   Exercise 2: Step Ups F/L 10 inch 10 reps x 2 sets  with each direction (L) LE only   Exercise 3: Standing 3 way hip 20 lbs (flexion and extension) 20 reps x 2 sets with each leg each direction; Left hip Abduction #5 x10 reps  Exercise 4: Total Gym L10 Squats and Heel Raises 20 reps x  2 sets with each one  Exercise 5: Hamstring Curl 35 lbs 20 reps x 2 sets   Exercise 6: Standing hip flexion/extension 7# 10x2  Exercise 7: Standing Heel/Toe Raises 20 for 2 sets  reps with each direction   Exercise 9: SLS on L LE only 30 second hold x 5 reps   Exercise 10: Total gym L10 Squats/heel toe raises  20 reps for 2 sets each     Assessment  Body structures, Functions, Activity limitations: Decreased functional mobility ; Decreased ADL status; Decreased ROM; Decreased strength  Treatment Diagnosis: (L) hip pain with motor impairment  Prognosis: Excellent     Activity Tolerance   Patient Tolerated treatment well  Comments: added Left hip abduction today to good tolerance with no increased pain.      Plan  Continue with current plan    Therapy Time  Time In: 1415  Time Out: 1500  Minutes: 45    Treatment Charges: Minutes Units   []  Ultrasound     []  Electrical-Stim     []  Iontophoresis     []  Traction     []  Massage       []  Eval     []  Gait     []  Vasopneumatic Device     [x]  Ther Exercise 45   3   []  Manual Therapy       []  Ther Activities       []  Aquatics     []  Neuro Re-Ed       []  Other       Total Treatment Time: 39  3     Faizan Valle PTA

## 2019-04-30 ENCOUNTER — HOSPITAL ENCOUNTER (OUTPATIENT)
Dept: PHYSICAL THERAPY | Age: 59
Setting detail: THERAPIES SERIES
Discharge: HOME | End: 2019-04-30
Payer: COMMERCIAL

## 2019-04-30 PROCEDURE — 97110 THERAPEUTIC EXERCISES: CPT

## 2019-05-02 ENCOUNTER — HOSPITAL ENCOUNTER (OUTPATIENT)
Dept: PHYSICAL THERAPY | Age: 59
Setting detail: THERAPIES SERIES
Discharge: HOME | End: 2019-05-02
Payer: COMMERCIAL

## 2019-05-02 PROCEDURE — 97110 THERAPEUTIC EXERCISES: CPT

## 2019-05-06 ENCOUNTER — HOSPITAL ENCOUNTER (OUTPATIENT)
Dept: PHYSICAL THERAPY | Age: 59
Setting detail: THERAPIES SERIES
Discharge: HOME | End: 2019-05-06
Payer: COMMERCIAL

## 2019-05-06 PROCEDURE — 97110 THERAPEUTIC EXERCISES: CPT

## 2019-05-06 NOTE — PROGRESS NOTES
Physical Therapy  Daily Treatment Note  Date: 2019  Patient Name: Lolis John  MRN: [id number]     :   1960    General  Chart Reviewed: Yes  Response To Previous Treatment: Not applicable  Family / Caregiver Present: No  Referring Practitioner: Stevie Lopez MD  PT Visit Information  Onset Date: 19  PT Insurance Information: Ubaldo   Total # of Visits Approved: 36  Total # of Visits to Date:   No Show: 0  Canceled Appointment: 0    Subjective  Pt. stated that after walking a lot this weekend he had no (L) hip pain or soreness  General Comment  Comments: Pt. walked in with no cane today, still has a slight limp while walking in. Pain Screening  Patient Currently in Pain: No     Treatment Activities:  Exercises  Exercise 1: Standing Gastroc Stretch 30 sec hold x 5 reps   Exercise 2: Step Ups F/L 10 inch 10 reps x 2 sets  with each direction (L) LE only   Exercise 3: Standing 3 way hip 20 lbs (flexion and extension) 20 reps x 2 sets with each leg each direction; Left hip Abduction with 5 lbs 10 reps x 2 sets  Exercise 5: Hamstring Curl 45 lbs 20 reps x 2 sets   Exercise 8: standing heel/toe raises 20x2   Exercise 9: SLS on L LE only 30 second hold x 5 reps   Exercise 11: Standing Squats 10 reps    Activity Tolerance  Patient Tolerated treatment well     Assessment  Conditions Requiring Skilled Therapeutic Intervention  Body structures, Functions, Activity limitations: Decreased functional mobility ; Decreased ADL status; Decreased ROM; Decreased strength  Assessment: During toe raises, pt. is lacking 25% of normal motion on the (L) LE compared to the (R) LE. During weight bearing squats at the end of range of motion pt. begins to bear more weight on their (L) LE.   Treatment Diagnosis: (L) hip pain with motor impairment  Prognosis: Excellent    Plan  Times per week: 3  Plan weeks: 6  Current Treatment Recommendations: Strengthening, Patient/Caregiver Education & Training, Pain Management, Home

## 2019-05-08 ENCOUNTER — HOSPITAL ENCOUNTER (OUTPATIENT)
Dept: PHYSICAL THERAPY | Age: 59
Setting detail: THERAPIES SERIES
Discharge: HOME | End: 2019-05-08
Payer: COMMERCIAL

## 2019-05-08 PROCEDURE — 97110 THERAPEUTIC EXERCISES: CPT

## 2019-05-08 NOTE — PROGRESS NOTES
[address]   Outpatient Physical Therapy  [address] [facility]. [address]  Phone: [phone]  Fax: [phone]  Daily Progress Note    Date: 19    Patient Name: Abram Bernal        MRN: [id number]  Account: [de-identified] : 1960      General Information  Chart Reviewed: Yes  Patient assessed for rehabilitation services?: Yes  Response To Previous Treatment: Not applicable  Family / Caregiver Present: No  Referring Practitioner: Isael Chapa MD  Referral Date : 19  Diagnosis: s/p (L) NAKIA for left hip fracture (Z87.81)  Follows Commands: Within Functional Limits  Onset Date: 19  PT Insurance Information: Ubaldo   Total # of Visits Approved: 36  Total # of Visits to Date:   No Show: 0  Canceled Appointment: 0    Subjective  Patient reports in one week he will be gone for vacation overall feels good without cane and more stable. Pain  Patient Currently in Pain: No    Objective  Exercise 1: Standing Gastroc Stretch 30 sec hold x 5 reps   Exercise 2: Step Ups F/L 10 inch 10 reps x 2 sets  with each direction (L) LE only   Exercise 3: Standing 3 way hip 20 lbs (flexion and extension) 20 reps x 2 sets with each leg each direction; Left hip Abduction with 5 lbs 10 reps x 2 sets  Exercise 5: Hamstring Curl 45 lbs 20 reps x 2 sets   Exercise 8: standing heel/toe raises 20x2   Exercise 9: SLS on L LE only 30 second hold x 5 reps   Exercise 11: Standing Squats 10 reps    Comment  Pt. walked in with no cane today, still has a slight limp while walking in. Assessment: Body structures, Functions, Activity limitations: Decreased functional mobility ; Decreased ADL status; Decreased ROM; Decreased strength  Treatment Diagnosis: (L) hip pain with motor impairment  Prognosis: Excellent    Activity Tolerance  Patient Tolerated treatment well    Plan  Continue with current plan    Therapy Time:  Time In: 1045  Time Out: 1130  Minutes: 45  Timed Code Treatment Minutes: 45 Minutes    Treatment Charges: Minutes Units   []  Ultrasound     []  Electrical-Stim     []  Iontophoresis     []  Traction     []  Massage       []  Eval     []  Gait     []  Vasopneumatic Device     [x]  Ther Exercise 45  3   []  Manual Therapy       []  Ther Activities       []  Aquatics     []  Neuro Re-Ed       []  Other       Total Treatment Time: 39 3     Luci Lombard, PTA

## 2019-05-10 ENCOUNTER — HOSPITAL ENCOUNTER (OUTPATIENT)
Dept: PHYSICAL THERAPY | Age: 59
Setting detail: THERAPIES SERIES
Discharge: HOME | End: 2019-05-10
Payer: COMMERCIAL

## 2019-05-10 PROCEDURE — 97110 THERAPEUTIC EXERCISES: CPT

## 2019-05-10 NOTE — PROGRESS NOTES
Physical Therapy  Daily Treatment Note  Date: 5/10/2019  Patient Name: Linn Shirley  MRN: [id number]     :   1960    General  Chart Reviewed: Yes  Response To Previous Treatment: Not applicable  Family / Caregiver Present: No  Referring Practitioner: Vimal Bradshaw MD  PT Visit Information  Onset Date: 19  PT Insurance Information: Ubaldo   Total # of Visits Approved: 36  Total # of Visits to Date: 24  No Show: 0  Canceled Appointment: 0    Subjective  Subjective: Pt. reports soreness of his (L) quad. Pain Screening  Patient Currently in Pain: No     Treatment Activities  Exercises  Exercise 1: Standing Gastroc Stretch 30 sec hold x 5 reps   Exercise 2: Step Ups F/L 10 inch 10 reps x 2 sets  with each direction (L) LE only   Exercise 3: Standing 3 way hip 20 lbs (flexion and extension) 20 reps x 2 sets with each leg each direction; Left hip Abduction with 5 lbs 10 reps x 2 sets  Exercise 5: Hamstring Curl 45 lbs 20 reps x 2 sets   Exercise 7: Standing Heel/Toe Raises 10 reps x 2 sets  on foam pad  Exercise 9: SLS on L LE only 30 second hold x 5 reps   Exercise 11: Standing Squats 15 reps    Activity Tolerance  Patient tolerated treatment well    Assessment  Conditions Requiring Skilled Therapeutic Intervention  Body structures, Functions, Activity limitations: Decreased functional mobility ; Decreased ADL status; Decreased ROM; Decreased strength  Assessment: Circumduction is noted of the (R) LE and hip drop is also noted on the left side during gait.   Treatment Diagnosis: (L) hip pain with motor impairment  Prognosis: Excellent    Plan  Times per week: 3  Plan weeks: 6  Current Treatment Recommendations: Strengthening, Patient/Caregiver Education & Training, Pain Management, Home Exercise Program, Gait Training, Equipment Evaluation, Education, & procurement, Neuromuscular Re-education    Therapy Time   Individual Concurrent Group Co-treatment   Time In 1430         Time Out 6109         [id number] Treatment Charges: Minutes Units   []  Ultrasound     []  Electrical-Stim     []  Iontophoresis     []  Traction     []  Massage       []  Eval     []  Gait     [x]  Ther Exercise 40 3    []  Manual Therapy       []  Ther Activities       []  Aquatics     []  Vasopneumatic Device     []  Neuro Re-Ed       []  Other       Total Treatment Time: 36  3     Sally Alcazar, Student PT

## 2019-05-13 ENCOUNTER — HOSPITAL ENCOUNTER (OUTPATIENT)
Dept: PHYSICAL THERAPY | Age: 59
Setting detail: THERAPIES SERIES
Discharge: HOME | End: 2019-05-13
Payer: COMMERCIAL

## 2019-05-13 PROCEDURE — 97110 THERAPEUTIC EXERCISES: CPT

## 2019-05-13 NOTE — PROGRESS NOTES
[address]   Outpatient Physical Therapy  [address] [facility]. [address]  Phone: [phone]  Fax: [phone]  Daily Progress Note    Date: 19    Patient Name: Cindi Pearson        MRN: [id number]  Account: [de-identified] : 1960      General Information:  Chart Reviewed: Yes  Patient assessed for rehabilitation services?: Yes  Response To Previous Treatment: Not applicable  Family / Caregiver Present: No  Referring Practitioner: Mary Spencer MD  Referral Date : 19  Diagnosis: s/p (L) NAKIA for left hip fracture (Z87.81)  Follows Commands: Within Functional Limits  Onset Date: 19  PT Insurance Information: [employer]ign[employer]   Total # of Visits Approved: 36  Total # of Visits to Date:   No Show: 0  Canceled Appointment: 0    Subjective  No pain today     Pain:  Patient Currently in Pain: No    Objective  Exercise 1: Standing Gastroc Stretch 30 sec hold x 5 reps   Exercise 2: Step Ups F/L 10 inch 10 reps x 2 sets  with each direction (L) LE only   Exercise 3: Standing 3 way hip 20 lbs (flexion and extension) 20 reps x 2 sets with each leg each direction; Left hip Abduction with 5 lbs 10 reps x 2 sets  Exercise 5: Hamstring Curl 45 lbs 20 reps x 2 sets   Exercise 7: Standing Heel/Toe Raises 10 reps x 2 sets  on foam pad  Exercise 9: SLS on L LE only 30 second hold x 5 reps   Exercise 11: Standing Squats 15 reps    Comment  Using an ACE wrap below L knee    Assessment: Body structures, Functions, Activity limitations: Decreased functional mobility ; Decreased ADL status; Decreased ROM; Decreased strength  Assessment:  Pt. continues to be challenged by his therapy sessions, but it is not too much.   Treatment Diagnosis: (L) hip pain with motor impairment  Prognosis: Excellent    Activity Tolerance  Patient Tolerated treatment well    Plan  Continue with current plan     Therapy Time:  Time In: 1132  Time Out: 1218  Minutes: 46       Treatment Charges: Minutes Units   []  Ultrasound     [] Electrical-Stim     []  Iontophoresis     []  Traction     []  Massage       []  Eval     []  Gait     []  Vasopneumatic Device     []  Ther Exercise 46  3    []  Manual Therapy       []  Ther Activities       []  Aquatics     []  Neuro Re-Ed       []  Other       Total Treatment Time: 55 3       Alban Jalloh, KATHARINA

## 2019-05-15 ENCOUNTER — HOSPITAL ENCOUNTER (OUTPATIENT)
Dept: PHYSICAL THERAPY | Age: 59
Setting detail: THERAPIES SERIES
Discharge: HOME | End: 2019-05-15
Payer: COMMERCIAL

## 2019-05-15 PROCEDURE — 97110 THERAPEUTIC EXERCISES: CPT

## 2019-05-15 ASSESSMENT — PAIN DESCRIPTION - LOCATION: LOCATION: HIP

## 2019-05-15 ASSESSMENT — PAIN DESCRIPTION - ORIENTATION: ORIENTATION: LEFT;ANTERIOR

## 2019-05-15 ASSESSMENT — PAIN DESCRIPTION - PAIN TYPE: TYPE: ACUTE PAIN

## 2019-05-15 ASSESSMENT — PAIN SCALES - GENERAL: PAINLEVEL_OUTOF10: 1

## 2019-05-16 ENCOUNTER — HOSPITAL ENCOUNTER (OUTPATIENT)
Dept: PHYSICAL THERAPY | Age: 59
Setting detail: THERAPIES SERIES
Discharge: HOME | End: 2019-05-16
Payer: COMMERCIAL

## 2019-05-16 PROCEDURE — 97110 THERAPEUTIC EXERCISES: CPT

## 2019-05-16 NOTE — PROGRESS NOTES
Physical Therapy  [address] [facility]   Outpatient Physical Therapy  [address] [facility]. [address]  Phone: [phone]  Fax: [phone]  Daily Progress Note    Date: 19    Patient Name: Chloé Villasenor        MRN: [id number]  Account: [de-identified] : 1960      General Information:  Referring Practitioner: Riky Shah MD  Referral Date : 19  Diagnosis: s/p (L) NAKIA for left hip fracture (Z87.81)  Onset Date: 19  PT Insurance Information: Ubaldo   Total # of Visits Approved: 36  Total # of Visits to Date:   No Show: 0  Canceled Appointment: 0    Subjective:  Very sore after last visit but a \"good sore\" like he worked the muscles. Woke up stiff this AM     Pain:  Patient Currently in Pain: Denies    Objective:  Exercise 1: Standing Gastroc Stretch 30 sec hold x 5 reps   Exercise 2: Step Ups F/L 10 inch 20 reps x 2 sets  with each direction (L) LE only   Exercise 3: Standing 3 way hip 20 lbs flexion & extension; 5 lbs Abduction 20 reps x 2 sets with L LE; 15 lbs R LE 10 reps each  Exercise 5: Hamstring Curl 45 lbs 20 reps x 2 sets   Exercise 7: Standing Heel/Toe Raises 10 reps x 2 sets  on foam pad  Exercise 9: Foam SLS on L LE only 15 second hold x 5 reps   Exercise 11: Squat Foam Pad 15 reps    Assessment  Body structures, Functions, Activity limitations: Decreased functional mobility ; Decreased ADL status; Decreased ROM; Decreased strength  Treatment Diagnosis: (L) hip pain with motor impairment  Prognosis: Excellent  REQUIRES PT FOLLOW UP: Yes    Activity Tolerance  Patient Tolerated treatment well  Comments: Less UE support this date- improved L LE stability to tolerance.  Encouraged upright posture    Plan  Continue with current plan(Progress L LE strength and stability)    Therapy Time  Time In: 1030  Time Out: 1110  Minutes: 40  Timed Code Treatment Minutes: 40 Minutes    Treatment Charges: Minutes Units   []  Ultrasound     []  Electrical-Stim     []  Iontophoresis     [] Traction     []  Massage       []  Eval     []  Gait     []  Vasopneumatic Device     [x]  Ther Exercise 40  3   []  Manual Therapy       []  Ther Activities       []  Aquatics     []  Neuro Re-Ed       []  Other       Total Treatment Time: [place], [place]

## 2019-05-28 ENCOUNTER — HOSPITAL ENCOUNTER (OUTPATIENT)
Dept: PHYSICAL THERAPY | Age: 59
Setting detail: THERAPIES SERIES
Discharge: HOME | End: 2019-05-28
Payer: COMMERCIAL

## 2019-05-28 PROCEDURE — 97110 THERAPEUTIC EXERCISES: CPT

## 2019-05-28 NOTE — PROGRESS NOTES
Physical Therapy   [address] [facility]   Outpatient Physical Therapy  [address] [facility]. [address]  Phone: [phone]  Fax: [phone]  Daily Progress Note    Date: 19    Patient Name: Juanjo William        MRN: [id number]  Account: [de-identified] : 1960      General Information:  Referring Practitioner: Mckenna Maradiaga MD  Referral Date : 19  Diagnosis: s/p (L) NAKIA for left hip fracture (Z87.81)  Onset Date: 19  PT Insurance Information: Ubaldo   Total # of Visits Approved: 36  Total # of Visits to Date:   No Show: 0  Canceled Appointment: 0    Subjective:  Subjective: Just got back from [facility]. I feel pretty good. Pain:  Patient Currently in Pain: Denies          Objective:  Exercise 1: Standing Gastroc Stretch 30 sec hold x 5 reps   Exercise 2: Step Ups F/L 10 inch 20 reps x 2 sets  with each direction (L) LE only   Exercise 3: Standing 3 way hip 20 lbs flexion & extension; 10 Lbs Abduction 20 reps x 2 sets with L LE; 15 lbs R LE 10reps each(renetta inc wt on ABD well)  Exercise 5: Hamstring Curl 45 lbs 20 reps x 2 sets   Exercise 7: Standing Heel/Toe Raises 10 reps x 2 sets  on foam pad  Exercise 9: Foam SLS on L LE only 15 second hold x 5 reps   Exercise 11: Squat Foam Pad 15 reps          Comment:       Assessment: Body structures, Functions, Activity limitations: Decreased functional mobility ; Decreased ADL status; Decreased ROM; Decreased strength  Treatment Diagnosis: (L) hip pain with motor impairment  Prognosis: Excellent  REQUIRES PT FOLLOW UP: Yes  Activity Tolerance: Patient Tolerated treatment well  Comments: Increased wt on ex's due to inc strength noted.      Plan:  Plan: Continue with current plan(Progress L LE strength and stability)    Therapy Time:  Time In: 1015  Time Out: 1104  Minutes: 49       Treatment Charges: Minutes Units   []  Ultrasound     []  Electrical-Stim     []  Iontophoresis     []  Traction     []  Massage       []  Eval     []  Gait     [] Vasopneumatic Device     [x]  Ther Exercise 49  3   []  Manual Therapy       []  Ther Activities       []  Aquatics     []  Neuro Re-Ed       []  Other       Total Treatment Time: [address], PT

## 2019-05-30 ENCOUNTER — HOSPITAL ENCOUNTER (OUTPATIENT)
Dept: PHYSICAL THERAPY | Age: 59
Setting detail: THERAPIES SERIES
Discharge: HOME | End: 2019-05-30
Payer: COMMERCIAL

## 2019-05-30 PROCEDURE — 97110 THERAPEUTIC EXERCISES: CPT

## 2019-05-30 NOTE — PROGRESS NOTES
strength  Treatment Diagnosis: (L) hip pain with motor impairment  Prognosis: Excellent  Activity Tolerance  Activity Tolerance: Patient Tolerated treatment well  Comments: Assessed L hip strength. ..consoderable weakness noted. Assessed quad flexibility-very tight-modified HEP     Plan:     See ex changes in note and modify accordingly.    Timed Code Treatment Minutes: 50 Minutes     Therapy Time   Individual Concurrent Group Co-treatment   Time In 1120         Time Out 1210         Minutes 50         Timed Code Treatment Minutes: 48 MinutesThere ex x 3       Bradly Lora PT

## 2019-05-31 ENCOUNTER — HOSPITAL ENCOUNTER (OUTPATIENT)
Dept: PHYSICAL THERAPY | Age: 59
Setting detail: THERAPIES SERIES
Discharge: HOME | End: 2019-05-31
Payer: COMMERCIAL

## 2019-05-31 PROCEDURE — 97110 THERAPEUTIC EXERCISES: CPT

## 2019-05-31 NOTE — PROGRESS NOTES
Electrical-Stim     []  Iontophoresis     []  Traction     []  Massage       []  Eval     []  Gait     [x]  Ther Exercise 45  3    []  Manual Therapy       []  Ther Activities       []  Aquatics     []  Vasopneumatic Device     []  Neuro Re-Ed       []  Other       Total Treatment Time: 39 3      MALIA JassoT

## 2019-06-03 ENCOUNTER — HOSPITAL ENCOUNTER (OUTPATIENT)
Dept: PHYSICAL THERAPY | Age: 59
Setting detail: THERAPIES SERIES
Discharge: HOME | End: 2019-06-03
Payer: COMMERCIAL

## 2019-06-03 PROCEDURE — 97110 THERAPEUTIC EXERCISES: CPT

## 2019-06-03 NOTE — PROGRESS NOTES
[]  Iontophoresis     []  Traction     []  Massage       []  Eval     []  Gait     []  Vasopneumatic Device     [x]  Ther Exercise 45  3   []  Manual Therapy       []  Ther Activities       []  Aquatics     []  Neuro Re-Ed       []  Other       Total Treatment Time: 39 3     Mittie Pump, PTA

## 2019-06-05 ENCOUNTER — HOSPITAL ENCOUNTER (OUTPATIENT)
Dept: PHYSICAL THERAPY | Age: 59
Setting detail: THERAPIES SERIES
Discharge: HOME | End: 2019-06-05
Payer: COMMERCIAL

## 2019-06-05 PROCEDURE — 97110 THERAPEUTIC EXERCISES: CPT

## 2019-06-05 NOTE — PROGRESS NOTES
Physical Therapy  Daily Treatment Note  Date: 2019  Patient Name: Kandy Perez  MRN: [id number]     :   1960    General  Chart Reviewed: Yes  Response To Previous Treatment: Not applicable  Family / Caregiver Present: No  Referring Practitioner: Ethan Peoples MD  PT Visit Information  Onset Date: 19  PT Insurance Information: Ubaldo   Total # of Visits Approved: 36  Total # of Visits to Date: 32  No Show: 0  Canceled Appointment: 0    Subjective  Pain Screening  Patient Currently in Pain: No  Pain Assessment  Multiple Pain Sites: No  Vital Signs  Patient Currently in Pain: No      Treatment Activities  Exercises  Exercise 4: 35 # cable column squat lift 2x10  Exercise 6: 17# cable column side stepping 4x ernesto  Exercise 7: Standing Heel/Toe Raises 20 reps x 2 sets  on foam pad  Exercise 9: Foam SLS on L LE only 15 second hold x 5 reps   Exercise 11: Squat Foam Pad 15 reps  Exercise 13: foam roller L quads prone x 3 min  Exercise 14: HEP-quadruped rock 105x5\" hold   Exercise 15: Side lying (L) hip abduction 10 reps x 2 sets   Exercise 16: Side lying (L) hip adduction 5 reps x 2 sets   Exercise 17: Side lying Clamshells 10 reps x 2 sets with each side     Activity Tolerance  Patient tolerated treatment well     Assessment  Conditions Requiring Skilled Therapeutic Intervention  Body structures, Functions, Activity limitations: Decreased functional mobility ; Decreased ADL status; Decreased ROM; Decreased strength  Treatment Diagnosis: (L) hip pain with motor impairment  Prognosis: Excellent    Plan  Times per week: 3  Plan weeks: 12  Current Treatment Recommendations: Strengthening, Patient/Caregiver Education & Training, Pain Management, Home Exercise Program, Gait Training, Equipment Evaluation, Education, & procurement, Neuromuscular Re-education    Therapy Time   Individual Concurrent Group Co-treatment   Time In 1600         Time Out 1700         Minutes 60           Treatment Charges: Minutes Units   []  Ultrasound     []  Electrical-Stim     []  Iontophoresis     []  Traction     []  Massage       []  Eval     []  Gait     [x]  Ther Exercise 45  3    []  Manual Therapy       []  Ther Activities       []  Aquatics     []  Vasopneumatic Device     []  Neuro Re-Ed       []  Other       Total Treatment Time: 39 3      MALIA CalvilloT

## 2019-06-07 ENCOUNTER — HOSPITAL ENCOUNTER (OUTPATIENT)
Dept: PHYSICAL THERAPY | Age: 59
Setting detail: THERAPIES SERIES
Discharge: HOME | End: 2019-06-07
Payer: COMMERCIAL

## 2019-06-07 PROCEDURE — 97110 THERAPEUTIC EXERCISES: CPT

## 2019-06-07 NOTE — PROGRESS NOTES
[facility]   Outpatient Physical Therapy  [address] [place] [address]. [address]  Phone: [phone]  Fax: [phone]  Daily Progress Note    Date: 19    Patient Name: Silver Salgado        MRN: [id number]  Account: [de-identified] : 1960      General Information:  Chart Reviewed: Yes  Patient assessed for rehabilitation services?: Yes  Response To Previous Treatment: Not applicable  Family / Caregiver Present: No  Referring Practitioner: William Cristobal MD  Referral Date : 19  Diagnosis: s/p (L) NAKIA for left hip fracture (Z87.81)  Follows Commands: Within Functional Limits  Onset Date: 19  PT Insurance Information: [employer]   Total # of Visits Approved: 36  Total # of Visits to Date: 35  No Show: 0  Canceled Appointment: 0    Subjective  No new complaints, states he was able to bend down on knees and get back up using some support from arms but previously was not able to get down to floor. Pain  Patient Currently in Pain: No    Objective  Exercise 4: 35 # cable column squat lift 2x10  Exercise 6: 17# cable column side stepping 10x2 ernesto  Exercise 7: Standing Heel/Toe Raises 20 reps x 2 sets  on foam pad  Exercise 9: Foam SLS on L LE only 15 second hold x 5 reps   Exercise 11: Squat Foam Pad 15 reps  Exercise 13: foam roller L quads prone x 3 min  Exercise 14: HEP-quadruped rock 105x5\" hold   Exercise 15: Side lying (L) hip abduction 10 reps x 2 sets   Exercise 16: Side lying (L) hip adduction 5 reps x 2 sets   Exercise 17: Side lying Clamshells 10 reps x 2 sets with each side     Assessment: Body structures, Functions, Activity limitations: Decreased functional mobility ; Decreased ADL status; Decreased ROM; Decreased strength  Treatment Diagnosis: (L) hip pain with motor impairment  Prognosis: Excellent    Activity Tolerance  Patient Tolerated treatment well    Plan  Continue with current plan    Therapy Time  Time In: 1615  Time Out: 1710  Minutes: 55  Timed Code Treatment Minutes: 50 Minutes    Treatment Charges: Minutes Units   []  Ultrasound     []  Electrical-Stim     []  Iontophoresis     []  Traction     []  Massage       []  Eval     []  Gait     []  Vasopneumatic Device     [x]  Ther Exercise 50  3   []  Manual Therapy       []  Ther Activities       []  Aquatics     []  Neuro Re-Ed       []  Other       Total Treatment Time: 48 3     Chiara Ground, PTA

## 2019-06-10 ENCOUNTER — HOSPITAL ENCOUNTER (OUTPATIENT)
Dept: PHYSICAL THERAPY | Age: 59
Setting detail: THERAPIES SERIES
Discharge: HOME | End: 2019-06-10
Payer: COMMERCIAL

## 2019-06-10 PROCEDURE — 97112 NEUROMUSCULAR REEDUCATION: CPT

## 2019-06-10 PROCEDURE — 97110 THERAPEUTIC EXERCISES: CPT

## 2019-06-12 ENCOUNTER — HOSPITAL ENCOUNTER (OUTPATIENT)
Dept: PHYSICAL THERAPY | Age: 59
Setting detail: THERAPIES SERIES
Discharge: HOME | End: 2019-06-12
Payer: COMMERCIAL

## 2019-06-12 PROCEDURE — 97110 THERAPEUTIC EXERCISES: CPT

## 2019-06-12 NOTE — PROGRESS NOTES
Physical Therapy  [facility]   Outpatient Physical Therapy  [address] [facility]. [address]  Phone: [phone]  Fax: [phone]  Daily Progress Note    Date: 19    Patient Name: Timbo Manuel        MRN: [id number]  Account: [de-identified] : 1960      General Information:  Chart Reviewed: Yes  Patient assessed for rehabilitation services?: Yes  Family / Caregiver Present: No  Referring Practitioner: Hadley Shone MD  Referral Date : 19  Diagnosis: s/p (L) NAKIA for left hip fracture (Z87.81)  Follows Commands: Within Functional Limits  Onset Date: 19  PT Insurance Information: [employer]   Total # of Visits Approved: 36  Total # of Visits to Date: 35  No Show: 0  Canceled Appointment: 0    Subjective:  Subjective: Pt reports being fatigued after working 12 hours yesterday and 10 hours today. Pain:  Patient Currently in Pain: No    Objective:  Exercise 4: 35 # cable column squat lift 2x10  Exercise 6: 17# cable column side stepping 10x2 ernesto  Exercise 7: Standing Heel/Toe Raises 20 reps x 2 sets  on foam pad  Exercise 9: Foam SLS on L LE only 15 second hold x 5 reps   Exercise 11: Squat Foam Pad 15 reps  Exercise 13: foam roller L quads prone x 3 min  Exercise 15: Side lying (L) hip abduction 10 reps x 2 sets   Exercise 16: Side lying (L) hip adduction 5 reps x 2 sets   Exercise 17: Side lying Clamshells 10 reps x 2 sets with each side      Assessment: Body structures, Functions, Activity limitations: Decreased functional mobility ; Decreased ADL status; Decreased ROM; Decreased strength  Treatment Diagnosis: (L) hip pain with motor impairment  Prognosis: Excellent  REQUIRES PT FOLLOW UP: Yes  Activity Tolerance: Patient Tolerated treatment well    Plan:  Plan: Continue with current plan    Therapy Time:  Time In: 1615  Time Out: 1700  Minutes: 45      Treatment Charges: Minutes Units   []  Ultrasound     []  Electrical-Stim     []  Iontophoresis     []  Traction     []  Massage       []  Eval     []  Gait     []  Vasopneumatic Device     [x]  Ther Exercise 40 3    []  Manual Therapy       []  Ther Activities       []  Aquatics     []  Neuro Re-Ed       []  Other       Total Treatment Time: 40  3     Faizan Valle, PTA

## 2019-06-14 ENCOUNTER — APPOINTMENT (OUTPATIENT)
Dept: PHYSICAL THERAPY | Age: 59
End: 2019-06-14
Payer: COMMERCIAL

## 2019-06-14 ENCOUNTER — HOSPITAL ENCOUNTER (OUTPATIENT)
Dept: PHYSICAL THERAPY | Age: 59
Setting detail: THERAPIES SERIES
Discharge: HOME | End: 2019-06-14
Payer: COMMERCIAL

## 2019-06-14 PROCEDURE — 97110 THERAPEUTIC EXERCISES: CPT

## 2019-06-14 NOTE — PROGRESS NOTES
from condition with _4_ of_4__ long term goals     Comments/Function: Pt stated that he was symptom free/functional needs met (significant improvement shown on the LEFS)    PT Education: [x] Home Exercise Program  Comprehension [x] Good [] Fair [] Poor  [x] Plans/Goals developed/discussed with pt/family [] Other    Recommendations: Current prescription completed. All functional needs met. Pt was advise to continue with his current exercise program at home. He demonstrated a good understanding of it. Thank you for the referral, Juan Ramon     [x] Patient is Discharged At This Time Unless Further Orders Are Received. New Script Needed To Continue Treatment: [] No   [] Yes  (visits left on current prescription:        0       ) Please sign orders at the bottom of this page and return by faxing. Thank you. Current Treatment:  [x] Therapeutic Exercise    [] Modalities                [] Work Conditioning  [] Therapeutic Activity    [] Ultrasound  [] Electrical Stimulation  [] Gait Training     [] Massage       [] Lumbar/Cervical Traction  [] Neuromuscular Re-education [] Cold/hot pack [] Iontophoresis: 4 mg/mL  [x] Instruction in Home Exercise Program                     Dexamethasone Sodium  [] Manual Therapy             Phosphate 40-80 mA min  [] Aquatic Therapy                         [] Other:  More objective information is available upon request.                      Physical Therapy Prescription:      [] Continue current Rx    [] Therapist Discretion    [] Rx as below  Recommended Changes to Treatment:  [] Heat/Cold  [] Stretching  [] Therapeutic Exercise  [] Reconditioning  [] Massage  [] Ultrasound  [] Electrical Stim  [] Iontophoresis: 40 mg/ml Dexamethasone Sodium Phosphate 40-80 mA min  [] Aquatics  [] Other:  Frequency:          X/wk for          wks    Medicare/Regulatory Requirements:  I have reviewed this plan of care and certify a need for medically necessary rehabilitation services.   [] Physician Signature Date:       Thank you for your referral                    Electronically signed by: Purnima Estrada, PT DPT    [facility] ([facility]) @ [facility]  [address] [place] [address] [place]  [place], [id number] [place] [address]  Phone [phone]  Fax [phone]    Therapy Time   Individual Concurrent Group Co-treatment   Time In 6319         Time Out 1345         Minutes 60            Treatment Charges: Minutes Units   []  Ultrasound     []  Electrical-Stim     []  Iontophoresis     []  Traction     []  Massage       []  Eval     []  Gait     [x]  Ther Exercise 45  3    []  Manual Therapy       []  Ther Activities       []  Aquatics     []  Vasopneumatic Device     []  Neuro Re-Ed       []  Other       Total Treatment Time: 45 3

## 2019-07-31 ENCOUNTER — HOSPITAL ENCOUNTER (OUTPATIENT)
Age: 59
Discharge: HOME | End: 2019-07-31
Payer: COMMERCIAL

## 2019-07-31 DIAGNOSIS — R97.20 ELEVATED PSA: ICD-10-CM

## 2019-07-31 LAB — PROSTATE SPECIFIC ANTIGEN: 5.66 UG/L

## 2019-07-31 PROCEDURE — 84153 ASSAY OF PSA TOTAL: CPT

## 2019-07-31 PROCEDURE — 36415 COLL VENOUS BLD VENIPUNCTURE: CPT

## 2019-08-06 ENCOUNTER — OFFICE VISIT (OUTPATIENT)
Dept: UROLOGY | Age: 59
End: 2019-08-06
Payer: COMMERCIAL

## 2019-08-06 VITALS
HEART RATE: 70 BPM | WEIGHT: 221.4 LBS | DIASTOLIC BLOOD PRESSURE: 70 MMHG | BODY MASS INDEX: 34.75 KG/M2 | HEIGHT: 67 IN | SYSTOLIC BLOOD PRESSURE: 125 MMHG | TEMPERATURE: 98.1 F

## 2019-08-06 DIAGNOSIS — Z80.42 FAMILY HISTORY OF PROSTATE CANCER IN FATHER: ICD-10-CM

## 2019-08-06 DIAGNOSIS — R97.20 ELEVATED PSA: Primary | ICD-10-CM

## 2019-08-06 PROCEDURE — 99213 OFFICE O/P EST LOW 20 MIN: CPT | Performed by: UROLOGY

## 2019-08-06 ASSESSMENT — ENCOUNTER SYMPTOMS
VOMITING: 0
NAUSEA: 0
COUGH: 0
SHORTNESS OF BREATH: 0
EYE PAIN: 0
CONSTIPATION: 0
EYE REDNESS: 0
ABDOMINAL PAIN: 0
DIARRHEA: 0
WHEEZING: 0
BACK PAIN: 0

## 2019-08-06 NOTE — PROGRESS NOTES
[facility]  [facility] UROLOGY SPECIALISTS - [facility]  Inga Rosado [address] [address]  Dept:  Carli Fay [facility] Urology Office Note - Established    Patient:  Chris Sanchez  YOB: 1960  Date: 8/6/2019    The patient is a 62 y.o. male who presents todayfor evaluation of the following problems:   Chief Complaint   Patient presents with    Elevated PSA     6 mnth follow up       HPI  He is here for elevated PSA. He has had 3 negative biopsies, including a negative fusion biopsy at [facility]. He had a [facility] MDx, which was negative as well.he has minimal voiding complaints. His most recent PSA is 5.66, which is stable over the last 3 years. Summary of old records: N/A    Additional History: N/A    Procedures Today: N/A    Urinalysis today:  No results found for this visit on 08/06/19.   Last several PSA's:  Lab Results   Component Value Date    PSA 5.66 (H) 07/31/2019    PSA 7.90 (H) 12/28/2018    PSA 4.81 (H) 12/22/2017     Last total testosterone:  No results found for: TESTOSTERONE    AUA Symptom Score (8/6/2019):  INCOMPLETE EMPTYING: How often have you had the sensation of not emptying your bladder?: Not at all  FREQUENCY: How often do you have to urinate less than every two hours?: Not at all  INTERMITTENCY: How often have you found you stopped and started again several times when you urinated?: Not at all  URGENCY: How often have you found it difficult to postpone urination?: Not at all  WEAK STREAM: How often have you had a weak urinary stream?: Not at all  STRAINING: How often have you had to strain to start  urination?: Not at all  NOCTURIA: How many times did you typically get up at night to uriniate?: 2 Times  TOTAL I-PSS SCORE[de-identified] 2       Last BUN and creatinine:  Lab Results   Component Value Date    BUN 12 03/14/2019     Lab Results   Component Value Date    CREATININE 0.55 (L) 03/14/2019       Additional Lab/Culture results: none    Imaging Reviewed during station      Assessment and Plan      1. Elevated PSA           Plan:          PSA is stable. Has had multiple negative biopsies and a negative [facility] MDx as well. F/U in 1 year with a PSA. Return in about 1 year (around 8/6/2020) for Labs. Prescriptions Ordered:  No orders of the defined types were placed in this encounter. Orders Placed:  Orders Placed This Encounter   Procedures    PSA, Diagnostic     Standing Status:   Future     Standing Expiration Date:   8/5/2020           Olga Lidia Childs MD    Agree with the ROS entered by the MA.

## 2020-08-24 ENCOUNTER — HOSPITAL ENCOUNTER (OUTPATIENT)
Age: 60
Discharge: HOME | End: 2020-08-24
Payer: COMMERCIAL

## 2020-08-24 ENCOUNTER — TELEPHONE (OUTPATIENT)
Dept: UROLOGY | Age: 60
End: 2020-08-24

## 2020-08-24 LAB — PROSTATE SPECIFIC ANTIGEN: 5.99 UG/L

## 2020-08-24 PROCEDURE — 36415 COLL VENOUS BLD VENIPUNCTURE: CPT

## 2020-08-24 PROCEDURE — 84153 ASSAY OF PSA TOTAL: CPT

## 2020-09-01 ENCOUNTER — OFFICE VISIT (OUTPATIENT)
Dept: UROLOGY | Age: 60
End: 2020-09-01
Payer: COMMERCIAL

## 2020-09-01 VITALS — TEMPERATURE: 98.1 F

## 2020-09-01 PROCEDURE — 99213 OFFICE O/P EST LOW 20 MIN: CPT | Performed by: NURSE PRACTITIONER

## 2020-09-01 RX ORDER — BUPROPION HYDROCHLORIDE 300 MG/1
1 TABLET ORAL DAILY
COMMUNITY
Start: 2020-08-25 | End: 2021-04-14

## 2020-09-01 ASSESSMENT — ENCOUNTER SYMPTOMS
EYE REDNESS: 0
VOMITING: 0
COLOR CHANGE: 0
COUGH: 0
EYE PAIN: 0
NAUSEA: 0
BACK PAIN: 0
WHEEZING: 0
ABDOMINAL PAIN: 0
SHORTNESS OF BREATH: 0

## 2020-09-01 NOTE — PROGRESS NOTES
[address] [address]  Dept: [address]li F[facility] Urology Office Note - Established    Patient:  Roby Cobb  YOB: 1960  Date: 9/1/2020    The patient is a 61 y.o. male who presents todayfor evaluation of the following problems:   Chief Complaint   Patient presents with    Elevated PSA     yearly check with PSA        HPI  Here for annual follow up. Has had elevated PSA, his Dad has Hx of prostate cancer at age 58. He had a neg Bx in 6/2016. He had a fusion Bx in [facility] in Sept of 2016. 1/16/19 had select MDx for PSA of 7.9. He empties well, his morning stream is weak but he empties well. No dysuria, no hematuria. Current smoker. Summary of old records: N/A    Additional History: N/A    Procedures Today: N/A    Urinalysis today:  No results found for this visit on 09/01/20.   Last several PSA's:  Lab Results   Component Value Date    PSA 5.99 (H) 08/24/2020    PSA 5.66 (H) 07/31/2019    PSA 7.90 (H) 12/28/2018     Last total testosterone:  No results found for: TESTOSTERONE    AUA Symptom Score (9/1/2020):  INCOMPLETE EMPTYING: How often have you had the sensation of not emptying your bladder?: Not at all  FREQUENCY: How often do you have to urinate less than every two hours?: Less than 1 to 5 times  INTERMITTENCY: How often have you found you stopped and started again several times when you urinated?: Not at all  URGENCY: How often have you found it difficult to postpone urination?: Not at all  WEAK STREAM: How often have you had a weak urinary stream?: Less than Half the time  STRAINING: How often have you had to strain to start  urination?: Not at all  NOCTURIA: How many times did you typically get up at night to uriniate?: 1 Time  TOTAL I-PSS SCORE[de-identified] 4  How would you feel if you were to spend the rest of your life with your urinary condition?: Mostly Satisfied    Last BUN and creatinine:  Lab Results   Component Value Date    BUN 12 03/14/2019     Lab Results   Component Value Date    CREATININE 0.55 (L) 03/14/2019       Additional Lab/Culture results:     Imaging Reviewed during this Office Visit:   (results were independently reviewed by physician and radiology report verified)    PAST MEDICAL, FAMILY AND SOCIAL HISTORY UPDATE:  Past Medical History:   Diagnosis Date    Abnormal EKG 2004     Past Surgical History:   Procedure Laterality Date    CARDIAC CATHETERIZATION  2004    for abnormal EKG, No clots    KNEE SURGERY  2001    PROSTATE BIOPSY  6/10/2016    with ultrasound     Family History   Problem Relation Age of Onset    Heart Disease Mother     Prostate Cancer Father     High Blood Pressure Father     Diabetes Father     Heart Disease Father     Stroke Paternal Grandfather      Outpatient Medications Marked as Taking for the 9/1/20 encounter (Office Visit) with KEEGAN Stallworth CNP   Medication Sig Dispense Refill    buPROPion (WELLBUTRIN XL) 300 MG extended release tablet Take 1 tablet by mouth daily         Patient has no known allergies. Social History     Tobacco Use   Smoking Status Current Every Day Smoker    Packs/day: 1.00    Years: 40.00    Pack years: 40.00    Types: Cigarettes    Start date: 5/27/1975   Smokeless Tobacco Never Used     (Ifpatient a smoker, smoking cessation counseling offered)    Social History     Substance and Sexual Activity   Alcohol Use No    Alcohol/week: 0.0 standard drinks       REVIEW OF SYSTEMS:  Review of Systems    Physical Exam:      Vitals:    09/01/20 1634   Temp: 98.1 °F (36.7 °C)     There is no height or weight on file to calculate BMI. Patient is a 61 y.o. male in no acute distress and alert and oriented to person, place and time. Physical Exam  Constitutional: Patient in no acute distress. Neuro: Alert and oriented to person, place and time.   Psych: Mood normal, affect normal  Skin:warm, pink,  No rash noted  HEENT: Head: Normocephalic andatraumatic  Conjunctivae and EOM are normal. Pupils are equal, round  Nose:Normal  Right External Ear: Normal; Left External Ear: Normal  Mouth: Mucosa Moist  Neck: Supple  Lungs: Respiratory effort is normal  Cardiovascular: Warm & Pink  Abdomen: Soft, non-tender, non-distended  Bladder non-tender and not distended. Musculoskeletal: Normal gait and station  Prostate: enlarged, no induration or nodule. Assessment and Plan      1. Elevated PSA    2. Weak urinary stream           Plan:     1 year PSA    Report any blood in the urine- smoking cessation      report  worsening urination. Return in about 1 year (around 9/1/2021) for PSA. Prescriptions Ordered:  No orders of the defined types were placed in this encounter. Orders Placed:  Orders Placed This Encounter   Procedures    PSA, Diagnostic     Standing Status:   Future     Standing Expiration Date:   9/1/2021           KEEGAN Ruffin CNP    Reviewed and Agree with the ROS entered by the MA.

## 2021-01-14 ENCOUNTER — HOSPITAL ENCOUNTER (OUTPATIENT)
Dept: ULTRASOUND IMAGING | Age: 61
Discharge: HOME | End: 2021-01-16
Payer: COMMERCIAL

## 2021-01-14 DIAGNOSIS — R22.2 ABDOMINAL WALL LUMP: ICD-10-CM

## 2021-01-14 PROCEDURE — 76705 ECHO EXAM OF ABDOMEN: CPT

## 2021-04-14 ENCOUNTER — HOSPITAL ENCOUNTER (OUTPATIENT)
Dept: PREADMISSION TESTING | Age: 61
Discharge: HOME | End: 2021-04-18
Payer: COMMERCIAL

## 2021-04-14 ENCOUNTER — HOSPITAL ENCOUNTER (OUTPATIENT)
Dept: CT IMAGING | Age: 61
Discharge: HOME | End: 2021-04-16
Payer: COMMERCIAL

## 2021-04-14 VITALS
SYSTOLIC BLOOD PRESSURE: 130 MMHG | HEIGHT: 67 IN | OXYGEN SATURATION: 99 % | WEIGHT: 237 LBS | RESPIRATION RATE: 18 BRPM | HEART RATE: 80 BPM | TEMPERATURE: 98.1 F | DIASTOLIC BLOOD PRESSURE: 98 MMHG | BODY MASS INDEX: 37.2 KG/M2

## 2021-04-14 LAB
ABSOLUTE EOS #: 0.1 K/UL (ref 0–0.4)
ABSOLUTE IMMATURE GRANULOCYTE: ABNORMAL K/UL (ref 0–0.3)
ABSOLUTE LYMPH #: 1.8 K/UL (ref 1–4.8)
ABSOLUTE MONO #: 0.6 K/UL (ref 0.1–1.3)
ANION GAP SERPL CALCULATED.3IONS-SCNC: 7 MMOL/L (ref 9–17)
BASOPHILS # BLD: 1 % (ref 0–2)
BASOPHILS ABSOLUTE: 0 K/UL (ref 0–0.2)
BUN BLDV-MCNC: 10 MG/DL (ref 8–23)
BUN/CREAT BLD: ABNORMAL (ref 9–20)
CALCIUM SERPL-MCNC: 9.7 MG/DL (ref 8.6–10.4)
CHLORIDE BLD-SCNC: 102 MMOL/L (ref 98–107)
CO2: 31 MMOL/L (ref 20–31)
CREAT SERPL-MCNC: 0.74 MG/DL (ref 0.7–1.2)
DIFFERENTIAL TYPE: ABNORMAL
EOSINOPHILS RELATIVE PERCENT: 2 % (ref 0–4)
GFR AFRICAN AMERICAN: >60 ML/MIN
GFR NON-AFRICAN AMERICAN: >60 ML/MIN
GFR SERPL CREATININE-BSD FRML MDRD: ABNORMAL ML/MIN/{1.73_M2}
GFR SERPL CREATININE-BSD FRML MDRD: ABNORMAL ML/MIN/{1.73_M2}
GLUCOSE BLD-MCNC: 119 MG/DL (ref 70–99)
HCT VFR BLD CALC: 44.3 % (ref 41–53)
HEMOGLOBIN: 15 G/DL (ref 13.5–17.5)
IMMATURE GRANULOCYTES: ABNORMAL %
LYMPHOCYTES # BLD: 26 % (ref 24–44)
MCH RBC QN AUTO: 31.5 PG (ref 26–34)
MCHC RBC AUTO-ENTMCNC: 34 G/DL (ref 31–37)
MCV RBC AUTO: 92.7 FL (ref 80–100)
MONOCYTES # BLD: 8 % (ref 1–7)
NRBC AUTOMATED: ABNORMAL PER 100 WBC
PDW BLD-RTO: 13.4 % (ref 11.5–14.9)
PLATELET # BLD: 229 K/UL (ref 150–450)
PLATELET ESTIMATE: ABNORMAL
PMV BLD AUTO: 9.7 FL (ref 6–12)
POTASSIUM SERPL-SCNC: 5.1 MMOL/L (ref 3.7–5.3)
RBC # BLD: 4.77 M/UL (ref 4.5–5.9)
RBC # BLD: ABNORMAL 10*6/UL
SEG NEUTROPHILS: 63 % (ref 36–66)
SEGMENTED NEUTROPHILS ABSOLUTE COUNT: 4.2 K/UL (ref 1.3–9.1)
SODIUM BLD-SCNC: 140 MMOL/L (ref 135–144)
WBC # BLD: 6.7 K/UL (ref 3.5–11)
WBC # BLD: ABNORMAL 10*3/UL

## 2021-04-14 PROCEDURE — 85025 COMPLETE CBC W/AUTO DIFF WBC: CPT

## 2021-04-14 PROCEDURE — 6360000004 HC RX CONTRAST MEDICATION: Performed by: SURGERY

## 2021-04-14 PROCEDURE — 80048 BASIC METABOLIC PNL TOTAL CA: CPT

## 2021-04-14 PROCEDURE — 74177 CT ABD & PELVIS W/CONTRAST: CPT

## 2021-04-14 PROCEDURE — 93005 ELECTROCARDIOGRAM TRACING: CPT | Performed by: ANESTHESIOLOGY

## 2021-04-14 PROCEDURE — 2580000003 HC RX 258: Performed by: SURGERY

## 2021-04-14 PROCEDURE — 36415 COLL VENOUS BLD VENIPUNCTURE: CPT

## 2021-04-14 RX ORDER — SODIUM CHLORIDE 0.9 % (FLUSH) 0.9 %
10 SYRINGE (ML) INJECTION PRN
Status: DISCONTINUED | OUTPATIENT
Start: 2021-04-14 | End: 2021-04-17 | Stop reason: HOSPADM

## 2021-04-14 RX ORDER — 0.9 % SODIUM CHLORIDE 0.9 %
80 INTRAVENOUS SOLUTION INTRAVENOUS ONCE
Status: COMPLETED | OUTPATIENT
Start: 2021-04-14 | End: 2021-04-14

## 2021-04-14 RX ORDER — ERGOCALCIFEROL 1.25 MG/1
50000 CAPSULE ORAL WEEKLY
COMMUNITY
End: 2022-09-12

## 2021-04-14 RX ADMIN — IOHEXOL 50 ML: 240 INJECTION, SOLUTION INTRATHECAL; INTRAVASCULAR; INTRAVENOUS; ORAL at 16:02

## 2021-04-14 RX ADMIN — SODIUM CHLORIDE, PRESERVATIVE FREE 10 ML: 5 INJECTION INTRAVENOUS at 16:02

## 2021-04-14 RX ADMIN — SODIUM CHLORIDE 80 ML: 9 INJECTION, SOLUTION INTRAVENOUS at 16:02

## 2021-04-14 RX ADMIN — IOPAMIDOL 75 ML: 755 INJECTION, SOLUTION INTRAVENOUS at 16:02

## 2021-04-14 ASSESSMENT — ENCOUNTER SYMPTOMS
CHOKING: 0
COUGH: 0
SHORTNESS OF BREATH: 0
GASTROINTESTINAL NEGATIVE: 1
SORE THROAT: 0
TROUBLE SWALLOWING: 0
WHEEZING: 1

## 2021-04-14 NOTE — PROGRESS NOTES
Medical clearance requested per Dr. Flory Spencer office note. Surgery scheduling will notify Dr. Flory montes who will be responsible for making sure the clearance is obtained and is in the chart for surgery.

## 2021-04-15 LAB
EKG ATRIAL RATE: 69 BPM
EKG P AXIS: 36 DEGREES
EKG P-R INTERVAL: 162 MS
EKG Q-T INTERVAL: 396 MS
EKG QRS DURATION: 100 MS
EKG QTC CALCULATION (BAZETT): 424 MS
EKG R AXIS: -5 DEGREES
EKG T AXIS: 19 DEGREES
EKG VENTRICULAR RATE: 69 BPM

## 2021-04-15 PROCEDURE — 93010 ELECTROCARDIOGRAM REPORT: CPT | Performed by: INTERNAL MEDICINE

## 2021-05-07 ENCOUNTER — HOSPITAL ENCOUNTER (OUTPATIENT)
Dept: NON INVASIVE DIAGNOSTICS | Age: 61
Discharge: HOME | End: 2021-05-07
Payer: COMMERCIAL

## 2021-05-07 ENCOUNTER — HOSPITAL ENCOUNTER (OUTPATIENT)
Dept: NUCLEAR MEDICINE | Age: 61
Discharge: HOME | End: 2021-05-09
Payer: COMMERCIAL

## 2021-05-07 VITALS — WEIGHT: 235 LBS | BODY MASS INDEX: 35.61 KG/M2 | HEIGHT: 68 IN

## 2021-05-07 DIAGNOSIS — E55.9 VITAMIN D DEFICIENCY: ICD-10-CM

## 2021-05-07 DIAGNOSIS — Z01.818 PREOP EXAMINATION: ICD-10-CM

## 2021-05-07 DIAGNOSIS — Z01.818 PREOPERATIVE EXAMINATION: ICD-10-CM

## 2021-05-07 DIAGNOSIS — K43.9 VENTRAL HERNIA WITHOUT OBSTRUCTION OR GANGRENE: ICD-10-CM

## 2021-05-07 DIAGNOSIS — F32.1 CURRENT MODERATE EPISODE OF MAJOR DEPRESSIVE DISORDER WITHOUT PRIOR EPISODE (HCC): ICD-10-CM

## 2021-05-07 DIAGNOSIS — R94.31 ABNORMAL EKG: ICD-10-CM

## 2021-05-07 DIAGNOSIS — R94.31 NONSPECIFIC ABNORMAL ELECTROCARDIOGRAM (ECG) (EKG): ICD-10-CM

## 2021-05-07 DIAGNOSIS — L40.9 PSORIASIS: ICD-10-CM

## 2021-05-07 LAB
LV EF: 55 %
LV EF: 66 %
LVEF MODALITY: NORMAL
LVEF MODALITY: NORMAL

## 2021-05-07 PROCEDURE — A9500 TC99M SESTAMIBI: HCPCS | Performed by: FAMILY MEDICINE

## 2021-05-07 PROCEDURE — 78452 HT MUSCLE IMAGE SPECT MULT: CPT

## 2021-05-07 PROCEDURE — 2580000003 HC RX 258: Performed by: FAMILY MEDICINE

## 2021-05-07 PROCEDURE — 93017 CV STRESS TEST TRACING ONLY: CPT

## 2021-05-07 PROCEDURE — 3430000000 HC RX DIAGNOSTIC RADIOPHARMACEUTICAL: Performed by: FAMILY MEDICINE

## 2021-05-07 PROCEDURE — 93306 TTE W/DOPPLER COMPLETE: CPT

## 2021-05-07 RX ORDER — SODIUM CHLORIDE 0.9 % (FLUSH) 0.9 %
5-40 SYRINGE (ML) INJECTION PRN
Status: ACTIVE | OUTPATIENT
Start: 2021-05-07 | End: 2021-05-07

## 2021-05-07 RX ORDER — SODIUM CHLORIDE 0.9 % (FLUSH) 0.9 %
10 SYRINGE (ML) INJECTION PRN
Status: DISCONTINUED | OUTPATIENT
Start: 2021-05-07 | End: 2021-05-10 | Stop reason: HOSPADM

## 2021-05-07 RX ORDER — METOPROLOL TARTRATE 5 MG/5ML
5 INJECTION INTRAVENOUS EVERY 5 MIN PRN
Status: ACTIVE | OUTPATIENT
Start: 2021-05-07 | End: 2021-05-07

## 2021-05-07 RX ORDER — AMINOPHYLLINE DIHYDRATE 25 MG/ML
50 INJECTION, SOLUTION INTRAVENOUS PRN
Status: ACTIVE | OUTPATIENT
Start: 2021-05-07 | End: 2021-05-07

## 2021-05-07 RX ORDER — ATROPINE SULFATE 0.1 MG/ML
0.5 INJECTION INTRAVENOUS EVERY 5 MIN PRN
Status: ACTIVE | OUTPATIENT
Start: 2021-05-07 | End: 2021-05-07

## 2021-05-07 RX ORDER — ALBUTEROL SULFATE 90 UG/1
2 AEROSOL, METERED RESPIRATORY (INHALATION) PRN
Status: ACTIVE | OUTPATIENT
Start: 2021-05-07 | End: 2021-05-07

## 2021-05-07 RX ORDER — SODIUM CHLORIDE 9 MG/ML
500 INJECTION, SOLUTION INTRAVENOUS CONTINUOUS PRN
Status: ACTIVE | OUTPATIENT
Start: 2021-05-07 | End: 2021-05-07

## 2021-05-07 RX ORDER — NITROGLYCERIN 0.4 MG/1
0.4 TABLET SUBLINGUAL EVERY 5 MIN PRN
Status: ACTIVE | OUTPATIENT
Start: 2021-05-07 | End: 2021-05-07

## 2021-05-07 RX ADMIN — SODIUM CHLORIDE, PRESERVATIVE FREE 10 ML: 5 INJECTION INTRAVENOUS at 09:16

## 2021-05-07 RX ADMIN — SODIUM CHLORIDE, PRESERVATIVE FREE 10 ML: 5 INJECTION INTRAVENOUS at 07:25

## 2021-05-07 RX ADMIN — TETRAKIS(2-METHOXYISOBUTYLISOCYANIDE)COPPER(I) TETRAFLUOROBORATE 31.8 MILLICURIE: 1 INJECTION, POWDER, LYOPHILIZED, FOR SOLUTION INTRAVENOUS at 09:16

## 2021-05-07 RX ADMIN — SODIUM CHLORIDE, PRESERVATIVE FREE 10 ML: 5 INJECTION INTRAVENOUS at 08:53

## 2021-05-07 RX ADMIN — SODIUM CHLORIDE, PRESERVATIVE FREE 10 ML: 5 INJECTION INTRAVENOUS at 07:26

## 2021-05-07 RX ADMIN — TETRAKIS(2-METHOXYISOBUTYLISOCYANIDE)COPPER(I) TETRAFLUOROBORATE 11.3 MILLICURIE: 1 INJECTION, POWDER, LYOPHILIZED, FOR SOLUTION INTRAVENOUS at 07:26

## 2021-05-07 NOTE — PROGRESS NOTES
CST Exercise. Stress Tech performs patient preparation of physical comfort, review test procedures, pre-stress EKG. Lung Sounds wheezing. Consent verified by pt. .  Educated patient on test procedure. Cardiologist reviewed pre-test EKG and is present for test.  Patient tolerated test well . Start /83 HR 58  Stop /87 HR 85  EKG portion of testing is completed and negative, nuc. med. portion is still pending.

## 2021-07-29 ENCOUNTER — HOSPITAL ENCOUNTER (OUTPATIENT)
Age: 61
Setting detail: SPECIMEN
Discharge: HOME | End: 2021-07-29
Payer: COMMERCIAL

## 2021-07-29 DIAGNOSIS — R97.20 ELEVATED PSA: ICD-10-CM

## 2021-07-29 LAB — PROSTATE SPECIFIC ANTIGEN: 7.37 UG/L

## 2021-08-20 ENCOUNTER — OFFICE VISIT (OUTPATIENT)
Dept: UROLOGY | Age: 61
End: 2021-08-20
Payer: COMMERCIAL

## 2021-08-20 VITALS
DIASTOLIC BLOOD PRESSURE: 70 MMHG | TEMPERATURE: 97.5 F | BODY MASS INDEX: 35.61 KG/M2 | WEIGHT: 235 LBS | SYSTOLIC BLOOD PRESSURE: 115 MMHG | HEIGHT: 68 IN | HEART RATE: 70 BPM

## 2021-08-20 DIAGNOSIS — R97.20 ELEVATED PSA: Primary | ICD-10-CM

## 2021-08-20 DIAGNOSIS — R39.12 WEAK URINARY STREAM: ICD-10-CM

## 2021-08-20 PROCEDURE — 99214 OFFICE O/P EST MOD 30 MIN: CPT | Performed by: UROLOGY

## 2021-08-20 RX ORDER — TAMSULOSIN HYDROCHLORIDE 0.4 MG/1
0.4 CAPSULE ORAL DAILY
Qty: 30 CAPSULE | Refills: 5 | Status: SHIPPED | OUTPATIENT
Start: 2021-08-20

## 2021-08-20 ASSESSMENT — ENCOUNTER SYMPTOMS
SHORTNESS OF BREATH: 0
ABDOMINAL PAIN: 0
BACK PAIN: 0
EYE PAIN: 0
EYE REDNESS: 0
NAUSEA: 0
CONSTIPATION: 0
VOMITING: 0
WHEEZING: 0
COUGH: 0
DIARRHEA: 0

## 2021-08-20 NOTE — PROGRESS NOTES
[address] [address]  Dept: [phone] [facility] Urology Office Note - Established    Patient:  Sunni Sierra  YOB: 1960  Date: 8/20/2021    The patient is a 61 y.o. male who presents todayfor evaluation of the following problems:   Chief Complaint   Patient presents with    1 Year Follow Up     Yearly follow-up with PSA       HPI  Here for annual f/u elevated PSA  Dad has hx prostate CA at age 58. Negative bx June 2016  Fusion bx in [place] Sept 2016 1/16/19- MDx for PSA 7.9      Doing well from urinary standpoint. Does have a weaker stream, nocturia 1x. He has had multiple negative biopsies. PSA has been up and down over the years. AUA SS is 8    Summary of old records: N/A    Additional History: N/A    Procedures Today: N/A    Urinalysis today:  No results found for this visit on 08/20/21. Last several PSA's:  Lab Results   Component Value Date    PSA 7.37 (H) 07/29/2021    PSA 5.99 (H) 08/24/2020    PSA 5.66 (H) 07/31/2019     Last total testosterone:  No results found for: TESTOSTERONE    AUA Symptom Score (8/20/2021):   INCOMPLETE EMPTYING: How often have you had the sensation of not emptying your bladder?: Not at all  FREQUENCY: How often do you have to urinate less than every two hours?: Not at all  INTERMITTENCY: How often have you found you stopped and started again several times when you urinated?: Less than 1 to 5 times  URGENCY: How often have you found it difficult to postpone urination?: About Half the time  WEAK STREAM: How often have you had a weak urinary stream?: Almost always  STRAINING: How often have you had to strain to start  urination?: Not at all  NOCTURIA: How many times did you typically get up at night to uriniate?: 1 Time  TOTAL I-PSS SCORE[de-identified] 10  How would you feel if you were to spend the rest of your life with your urinary condition?: Mixe    Last BUN and creatinine:  Lab Results   Component Value Date    BUN 10 04/14/2021     Lab Results   Component Value Date    CREATININE 0.74 04/14/2021       Additional Lab/Culture results: none    Imaging Reviewed during this Office Visit: none  (results were independently reviewed by physician and radiology report verified)    PAST MEDICAL, FAMILY AND SOCIAL HISTORY UPDATE:  Past Medical History:   Diagnosis Date    Abnormal EKG 2004    BPH (benign prostatic hyperplasia)     Closed left hip fracture (HCC)     Hx of fall down basement steps    Elevated blood-pressure reading without diagnosis of hypertension     Elevated PSA     Incomplete right bundle branch block     Per EKG 4-14-21    Psoriasis     Ventral hernia     Vitamin D deficiency      Past Surgical History:   Procedure Laterality Date    CARDIAC CATHETERIZATION  2004    for abnormal EKG, No clots    FINGER TRIGGER RELEASE Right     JOINT REPLACEMENT Left     hip    KNEE SURGERY  2001    R/t torn meniscus- had arthroscopy done- not sure what knee    PROSTATE BIOPSY  06/10/2016    with ultrasound- has had multiple bx's of prostate     Family History   Problem Relation Age of Onset    Heart Disease Mother     Prostate Cancer Father     High Blood Pressure Father     Diabetes Father     Heart Disease Father     Stroke Paternal Grandfather      Outpatient Medications Marked as Taking for the 8/20/21 encounter (Office Visit) with Marylen Platts, MD   Medication Sig Dispense Refill    tamsulosin (FLOMAX) 0.4 MG capsule Take 1 capsule by mouth daily Take one capsule daily to facilitate passage of ureteral stone 30 capsule 5    vitamin D (ERGOCALCIFEROL) 1.25 MG (95635 UT) CAPS capsule Take 50,000 Units by mouth once a week         Patient has no known allergies.   Social History     Tobacco Use   Smoking Status Current Every Day Smoker    Packs/day: 0.75    Years: 40.00    Pack years: 30.00    Types: Cigarettes    Start date: 5/27/1975 Smokeless Tobacco Never Used     (Ifpatient a smoker, smoking cessation counseling offered)    Social History     Substance and Sexual Activity   Alcohol Use No    Alcohol/week: 0.0 standard drinks       REVIEW OF SYSTEMS:  Review of Systems    Physical Exam:      Vitals:    08/20/21 1026   BP: 115/70   Pulse: 70   Temp: 97.5 °F (36.4 °C)     Body mass index is 35.73 kg/m². Patient is a 61 y.o. male in no acute distress and alert and oriented to person, place and time. Physical Exam  Constitutional: Patient in no acute distress. Neuro: Alert and oriented to person, place and time. Psych: Mood normal, affect normal  Skin: No rash noted  Lungs: Respiratory effort is normal  Cardiovascular: Warm & Pink  Abdomen: Soft, non-tender, non-distended with no CVA,  No flank tenderness,  Or hepatosplenomegaly   Lymphatics: No palpablelymphadenopathy. Bladder non-tender and not distended. Musculoskeletal: Normal gait and station  Testiscles: Normal, bilaterally  Prostate:  50 grams, smooth. Assessment and Plan      1. Elevated PSA    2. Weak urinary stream           Plan: Will start Flomax. Repeat PSA in 3 months. Discussed finasteride. Return in about 3 months (around 11/20/2021) for Labs. Prescriptions Ordered:  Orders Placed This Encounter   Medications    tamsulosin (FLOMAX) 0.4 MG capsule     Sig: Take 1 capsule by mouth daily Take one capsule daily to facilitate passage of ureteral stone     Dispense:  30 capsule     Refill:  5     Orders Placed:  Orders Placed This Encounter   Procedures    PSA, Diagnostic     Standing Status:   Future     Standing Expiration Date:   8/20/2022           Grace Henderson MD    Agree with the ROS entered by the MA.

## 2021-08-20 NOTE — LETTER
[address] [address]  Dept: [phone]  Dept Fax: [phone]        8/20/21    Patient: Sarah Mena  YOB: 1960    Dear Lata Johnson DO,    I had the pleasure of seeing one of your patients, Elsy Morelos today in the office today. Below are the relevant portions of my assessment and plan of care. IMPRESSION:  1. Elevated PSA    2. Weak urinary stream        PLAN:  Will start Flomax. Repeat PSA in 3 months. Discussed finasteride. Return in about 3 months (around 11/20/2021) for Labs. Prescriptions Ordered:  Orders Placed This Encounter   Medications    tamsulosin (FLOMAX) 0.4 MG capsule     Sig: Take 1 capsule by mouth daily Take one capsule daily to facilitate passage of ureteral stone     Dispense:  30 capsule     Refill:  5     Orders Placed:  Orders Placed This Encounter   Procedures    PSA, Diagnostic     Standing Status:   Future     Standing Expiration Date:   8/20/2022        Thank you for allowing me to participate in the care of this patient. I will keep you updated on this patient's follow up and I look forward to serving you and your patients again in the future.         Francesco Hernandez MD

## 2021-11-18 ENCOUNTER — HOSPITAL ENCOUNTER (OUTPATIENT)
Age: 61
Setting detail: SPECIMEN
Discharge: HOME | End: 2021-11-18

## 2021-11-18 DIAGNOSIS — R97.20 ELEVATED PSA: ICD-10-CM

## 2021-11-18 LAB — PROSTATE SPECIFIC ANTIGEN: 8.64 UG/L

## 2021-11-23 ENCOUNTER — OFFICE VISIT (OUTPATIENT)
Dept: UROLOGY | Age: 61
End: 2021-11-23
Payer: COMMERCIAL

## 2021-11-23 VITALS
WEIGHT: 235 LBS | HEIGHT: 68 IN | DIASTOLIC BLOOD PRESSURE: 83 MMHG | BODY MASS INDEX: 35.61 KG/M2 | HEART RATE: 72 BPM | TEMPERATURE: 97 F | SYSTOLIC BLOOD PRESSURE: 131 MMHG

## 2021-11-23 DIAGNOSIS — Z80.42 FAMILY HISTORY OF PROSTATE CANCER IN FATHER: ICD-10-CM

## 2021-11-23 DIAGNOSIS — R97.20 ELEVATED PSA: Primary | ICD-10-CM

## 2021-11-23 DIAGNOSIS — R39.12 WEAK URINARY STREAM: ICD-10-CM

## 2021-11-23 PROCEDURE — 99214 OFFICE O/P EST MOD 30 MIN: CPT | Performed by: UROLOGY

## 2021-11-23 RX ORDER — ALFUZOSIN HYDROCHLORIDE 10 MG/1
10 TABLET, EXTENDED RELEASE ORAL DAILY
Qty: 30 TABLET | Refills: 11 | Status: SHIPPED | OUTPATIENT
Start: 2021-11-23

## 2021-11-23 ASSESSMENT — ENCOUNTER SYMPTOMS
ABDOMINAL PAIN: 0
EYE PAIN: 0
DIARRHEA: 0
NAUSEA: 0
VOMITING: 0
WHEEZING: 0
BACK PAIN: 0
SHORTNESS OF BREATH: 0
COUGH: 0
EYE REDNESS: 0
CONSTIPATION: 0

## 2021-11-23 NOTE — LETTER
[address] [address]  Dept: [phone]  Dept Fax: [phone]        11/23/21    Patient: Vergie Kehr  YOB: 1960    Dear Domitila Reed DO,    I had the pleasure of seeing one of your patients, Alvin Guillermo today in the office today. Below are the relevant portions of my assessment and plan of care. IMPRESSION:  1. Elevated PSA    2. Weak urinary stream    3. Family history of prostate cancer in father        PLAN:  His PSA continues to worsen. His father had prostate cancer. Will start uroxatral.   Will obtain prostate MRI. Return in about 6 weeks (around 1/4/2022). Prescriptions Ordered:  Orders Placed This Encounter   Medications    alfuzosin (UROXATRAL) 10 MG extended release tablet     Sig: Take 1 tablet by mouth daily     Dispense:  30 tablet     Refill:  11     Orders Placed:  Orders Placed This Encounter   Procedures    MRI PROSTATE W WO CONTRAST     Standing Status:   Future     Standing Expiration Date:   11/23/2022        Thank you for allowing me to participate in the care of this patient. I will keep you updated on this patient's follow up and I look forward to serving you and your patients again in the future.         Artemio Rolle MD

## 2021-11-23 NOTE — PROGRESS NOTES
[address] [address]  Dept: [address] [facility] Urology Office Note - Established    Patient:  Keshav Constantino  YOB: 1960  Date: 11/23/2021    The patient is a 64 y.o. male who presents todayfor evaluation of the following problems:   Chief Complaint   Patient presents with    3 Month Follow-Up     PSA        HPI  He is here in follow up for nocturia and elevated PSA  He was doing better with Flomax, but it caused hives, so he stopped. He had a biopsy at [facility] 5 years ago, which was negative. Summary of old records: N/A    Additional History: N/A    Procedures Today: N/A    Urinalysis today:  No results found for this visit on 11/23/21. Last several PSA's:  Lab Results   Component Value Date    PSA 8.64 (H) 11/18/2021    PSA 7.37 (H) 07/29/2021    PSA 5.99 (H) 08/24/2020     Last total testosterone:  No results found for: TESTOSTERONE    AUA Symptom Score (11/23/2021):   INCOMPLETE EMPTYING: How often have you had the sensation of not emptying your bladder?: Not at all  FREQUENCY: How often do you have to urinate less than every two hours?: Not at all  INTERMITTENCY: How often have you found you stopped and started again several times when you urinated?: Not at all  URGENCY: How often have you found it difficult to postpone urination?: Less than 1 to 5 times  WEAK STREAM: How often have you had a weak urinary stream?: Not at all  STRAINING: How often have you had to strain to start  urination?: Not at all  NOCTURIA: How many times did you typically get up at night to uriniate?: 1 Time  TOTAL I-PSS SCORE[de-identified] 2  How would you feel if you were to spend the rest of your life with your urinary condition?: Pleased    Last BUN and creatinine:  Lab Results   Component Value Date    BUN 10 04/14/2021     Lab Results   Component Value Date    CREATININE 0.74 04/14/2021       Additional Lab/Culture results: none    Imaging Reviewed during this Office Visit: none  (results were independently reviewed by physician and radiology report verified)    PAST MEDICAL, FAMILY AND SOCIAL HISTORY UPDATE:  Past Medical History:   Diagnosis Date    Abnormal EKG 2004    BPH (benign prostatic hyperplasia)     Closed left hip fracture (HCC)     Hx of fall down basement steps    Elevated blood-pressure reading without diagnosis of hypertension     Elevated PSA     Incomplete right bundle branch block     Per EKG 4-14-21    Psoriasis     Ventral hernia     Vitamin D deficiency      Past Surgical History:   Procedure Laterality Date    CARDIAC CATHETERIZATION  2004    for abnormal EKG, No clots    FINGER TRIGGER RELEASE Right     JOINT REPLACEMENT Left     hip    KNEE SURGERY  2001    R/t torn meniscus- had arthroscopy done- not sure what knee    PROSTATE BIOPSY  06/10/2016    with ultrasound- has had multiple bx's of prostate     Family History   Problem Relation Age of Onset    Heart Disease Mother     Prostate Cancer Father     High Blood Pressure Father     Diabetes Father     Heart Disease Father     Stroke Paternal Grandfather      Outpatient Medications Marked as Taking for the 11/23/21 encounter (Office Visit) with Pat Silverman MD   Medication Sig Dispense Refill    alfuzosin (UROXATRAL) 10 MG extended release tablet Take 1 tablet by mouth daily 30 tablet 11    vitamin D (ERGOCALCIFEROL) 1.25 MG (81007 UT) CAPS capsule Take 50,000 Units by mouth once a week         Patient has no known allergies.   Social History     Tobacco Use   Smoking Status Current Every Day Smoker    Packs/day: 0.75    Years: 40.00    Pack years: 30.00    Types: Cigarettes    Start date: 5/27/1975   Smokeless Tobacco Never Used     (Ifpatient a smoker, smoking cessation counseling offered)    Social History     Substance and Sexual Activity   Alcohol Use No    Alcohol/week: 0.0 standard drinks       REVIEW OF SYSTEMS:  Review of Systems    Physical Exam:      Vitals:    11/23/21 1343   BP: 131/83   Pulse: 72   Temp: 97 °F (36.1 °C)     Body mass index is 35.73 kg/m². Patient is a 64 y.o. male in no acute distress and alert and oriented to person, place and time. Physical Exam  Constitutional: Patient in no acute distress. Neuro: Alert and oriented to person, place and time. Psych: Mood normal, affect normal  Lungs: Respiratory effort is normal  Cardiovascular: Warm & Pink  Abdomen: Soft, non-tender, non-distended with no CVA,  No flank tenderness,  Or hepatosplenomegaly   Lymphatics: No palpablelymphadenopathy. Bladder non-tender and not distended. Musculoskeletal: Normal gait and station      Assessment and Plan      1. Elevated PSA    2. Weak urinary stream    3. Family history of prostate cancer in father           Plan:          His PSA continues to worsen. His father had prostate cancer. Will start uroxatral.   Will obtain prostate MRI. Return in about 6 weeks (around 1/4/2022). Prescriptions Ordered:  Orders Placed This Encounter   Medications    alfuzosin (UROXATRAL) 10 MG extended release tablet     Sig: Take 1 tablet by mouth daily     Dispense:  30 tablet     Refill:  11     Orders Placed:  Orders Placed This Encounter   Procedures    MRI PROSTATE W WO CONTRAST     Standing Status:   Future     Standing Expiration Date:   11/23/2022           Oumar Rollins MD    Agree with the ROS entered by the MA.

## 2021-12-02 ENCOUNTER — TELEPHONE (OUTPATIENT)
Dept: UROLOGY | Age: 61
End: 2021-12-02

## 2021-12-02 NOTE — TELEPHONE ENCOUNTER
Patients wife called in and stated \" placed an order for my  to get an MRI of the prostate. Unfortunately, when I called to get it scheduled they told me he cannot have it done due to the fact he had a hip replacement and the only thing that would show are artifacts.  Is there any other order he can put in?\"    Writer let patient know that we would speak with  and give them a call back with an update

## 2022-06-10 ENCOUNTER — APPOINTMENT (OUTPATIENT)
Dept: GENERAL RADIOLOGY | Age: 62
End: 2022-06-10
Payer: OTHER MISCELLANEOUS

## 2022-06-10 ENCOUNTER — HOSPITAL ENCOUNTER (EMERGENCY)
Age: 62
Discharge: HOME | End: 2022-06-10
Attending: STUDENT IN AN ORGANIZED HEALTH CARE EDUCATION/TRAINING PROGRAM
Payer: OTHER MISCELLANEOUS

## 2022-06-10 VITALS
RESPIRATION RATE: 18 BRPM | SYSTOLIC BLOOD PRESSURE: 151 MMHG | OXYGEN SATURATION: 98 % | TEMPERATURE: 98.1 F | HEART RATE: 96 BPM | DIASTOLIC BLOOD PRESSURE: 82 MMHG

## 2022-06-10 DIAGNOSIS — S42.032A TRAUMATIC CLOSED FRACTURE OF DISTAL CLAVICLE WITH MINIMAL DISPLACEMENT, LEFT, INITIAL ENCOUNTER: Primary | ICD-10-CM

## 2022-06-10 PROCEDURE — 73030 X-RAY EXAM OF SHOULDER: CPT

## 2022-06-10 PROCEDURE — 99283 EMERGENCY DEPT VISIT LOW MDM: CPT
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
## 2022-06-11 ASSESSMENT — ENCOUNTER SYMPTOMS
BACK PAIN: 0
ABDOMINAL PAIN: 0
SHORTNESS OF BREATH: 0
COUGH: 0
NAUSEA: 0
RHINORRHEA: 0
VOMITING: 0
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
## 2022-06-15 ENCOUNTER — OFFICE VISIT (OUTPATIENT)
Dept: ORTHOPEDIC SURGERY | Age: 62
End: 2022-06-15
Payer: COMMERCIAL

## 2022-06-15 VITALS — HEIGHT: 68 IN | BODY MASS INDEX: 35.61 KG/M2 | WEIGHT: 235 LBS

## 2022-06-15 DIAGNOSIS — S42.035A NONDISPLACED FRACTURE OF LATERAL END OF LEFT CLAVICLE, INITIAL ENCOUNTER FOR CLOSED FRACTURE: Primary | ICD-10-CM

## 2022-06-15 PROCEDURE — 99203 OFFICE O/P NEW LOW 30 MIN: CPT | Performed by: ORTHOPAEDIC SURGERY

## 2022-06-15 PROCEDURE — 23500 CLTX CLAVICULAR FX W/O MNPJ: CPT | Performed by: ORTHOPAEDIC SURGERY
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
## 2022-06-23 ENCOUNTER — HOSPITAL ENCOUNTER (OUTPATIENT)
Age: 62
Setting detail: SPECIMEN
Discharge: HOME | End: 2022-06-23

## 2022-06-23 LAB
ABSOLUTE EOS #: 0.2 K/UL (ref 0–0.44)
ABSOLUTE IMMATURE GRANULOCYTE: <0.03 K/UL (ref 0–0.3)
ABSOLUTE LYMPH #: 1.73 K/UL (ref 1.1–3.7)
ABSOLUTE MONO #: 0.57 K/UL (ref 0.1–1.2)
ALBUMIN SERPL-MCNC: 3.8 G/DL (ref 3.5–5.2)
ALBUMIN/GLOBULIN RATIO: 1.3 (ref 1–2.5)
ALP BLD-CCNC: 100 U/L (ref 40–129)
ALT SERPL-CCNC: 11 U/L (ref 5–41)
ANION GAP SERPL CALCULATED.3IONS-SCNC: 16 MMOL/L (ref 9–17)
AST SERPL-CCNC: 13 U/L
BASOPHILS # BLD: 1 % (ref 0–2)
BASOPHILS ABSOLUTE: 0.04 K/UL (ref 0–0.2)
BILIRUB SERPL-MCNC: 0.27 MG/DL (ref 0.3–1.2)
BILIRUBIN DIRECT: <0.08 MG/DL
BILIRUBIN, INDIRECT: ABNORMAL MG/DL (ref 0–1)
BUN BLDV-MCNC: 18 MG/DL (ref 8–23)
CALCIUM SERPL-MCNC: 9.4 MG/DL (ref 8.6–10.4)
CHLORIDE BLD-SCNC: 104 MMOL/L (ref 98–107)
CHOLESTEROL, FASTING: 197 MG/DL
CHOLESTEROL/HDL RATIO: 5.3
CO2: 22 MMOL/L (ref 20–31)
CREAT SERPL-MCNC: 0.69 MG/DL (ref 0.7–1.2)
EOSINOPHILS RELATIVE PERCENT: 3 % (ref 1–4)
FOLATE: 8.4 NG/ML
GFR AFRICAN AMERICAN: >60 ML/MIN
GFR NON-AFRICAN AMERICAN: >60 ML/MIN
GFR SERPL CREATININE-BSD FRML MDRD: ABNORMAL ML/MIN/{1.73_M2}
GLUCOSE BLD-MCNC: 82 MG/DL (ref 70–99)
HCT VFR BLD CALC: 46.1 % (ref 40.7–50.3)
HDLC SERPL-MCNC: 37 MG/DL
HEMOGLOBIN: 15 G/DL (ref 13–17)
IMMATURE GRANULOCYTES: 0 %
LDL CHOLESTEROL: 146 MG/DL (ref 0–130)
LYMPHOCYTES # BLD: 24 % (ref 24–43)
MCH RBC QN AUTO: 31.3 PG (ref 25.2–33.5)
MCHC RBC AUTO-ENTMCNC: 32.5 G/DL (ref 28.4–34.8)
MCV RBC AUTO: 96 FL (ref 82.6–102.9)
MONOCYTES # BLD: 8 % (ref 3–12)
NRBC AUTOMATED: 0 PER 100 WBC
PDW BLD-RTO: 14.1 % (ref 11.8–14.4)
PLATELET # BLD: 249 K/UL (ref 138–453)
PMV BLD AUTO: 11.6 FL (ref 8.1–13.5)
POTASSIUM SERPL-SCNC: 5.4 MMOL/L (ref 3.7–5.3)
PROSTATE SPECIFIC ANTIGEN: 8.03 NG/ML
RBC # BLD: 4.8 M/UL (ref 4.21–5.77)
SEG NEUTROPHILS: 64 % (ref 36–65)
SEGMENTED NEUTROPHILS ABSOLUTE COUNT: 4.62 K/UL (ref 1.5–8.1)
SODIUM BLD-SCNC: 142 MMOL/L (ref 135–144)
T. PALLIDUM, IGG: NONREACTIVE
TOTAL PROTEIN: 6.8 G/DL (ref 6.4–8.3)
TRIGLYCERIDE, FASTING: 72 MG/DL
TSH SERPL DL<=0.05 MIU/L-ACNC: 2.18 UIU/ML (ref 0.3–5)
VITAMIN B-12: 380 PG/ML (ref 232–1245)
VITAMIN D 25-HYDROXY: 18.9 NG/ML
WBC # BLD: 7.2 K/UL (ref 3.5–11.3)

## 2022-06-30 ENCOUNTER — HOSPITAL ENCOUNTER (OUTPATIENT)
Age: 62
Setting detail: SPECIMEN
Discharge: HOME | End: 2022-06-30

## 2022-06-30 LAB — POTASSIUM SERPL-SCNC: 4.2 MMOL/L (ref 3.7–5.3)

## 2022-07-07 ENCOUNTER — OFFICE VISIT (OUTPATIENT)
Dept: ORTHOPEDIC SURGERY | Age: 62
End: 2022-07-07

## 2022-07-07 VITALS — BODY MASS INDEX: 35.61 KG/M2 | HEIGHT: 68 IN | WEIGHT: 235 LBS

## 2022-07-07 DIAGNOSIS — S42.035A NONDISPLACED FRACTURE OF LATERAL END OF LEFT CLAVICLE, INITIAL ENCOUNTER FOR CLOSED FRACTURE: Primary | ICD-10-CM

## 2022-07-07 PROCEDURE — 99024 POSTOP FOLLOW-UP VISIT: CPT | Performed by: ORTHOPAEDIC SURGERY

## 2022-07-07 NOTE — PROGRESS NOTES
HPI: Laila Machuca is a 64 y.o. old male who is approximately 4 weeks out from a closed left distal clavicle fracture. He is doing very well at this time reporting minimal pain in the shoulder. He is remained in his sling and is kept up with his pendulum exercises. Physical Exam:  Ht 5' 8\" (1.727 m)   Wt 235 lb (106.6 kg)   BMI 35.73 kg/m²   General Appearance: alert, well appearing, and in no distress  Mental Status: alert, oriented to person, place, and time  Evaluation of the patient's left shoulder and upper extremity demonstrates intact skin without warmth, erythema, or notable swelling. Sensation is grossly intact light touch in all dermatomes and he has a 2+ radial pulse with brisk capillary refill in his fingers. He is nontender to palpation over the distal end of the clavicle. Imaging Studies: 2 x-ray views of the left clavicle completed on 7/7/2022 were viewed independently demonstrating maintained alignment of the distal clavicle fracture with interval callus formation noted across the fracture site. Impression and plan: Laila Machuca is a 64 y.o. old male who is approximately 4 weeks out from a closed left distal clavicle fracture. As noted above he remains in acceptable alignment and appears to be healing appropriately. Consequently he was encouraged to wean out of his sling at this time. He can start using this arm for light activities of daily living limiting any lifting pushing or pulling to 1 to 2 pounds. A prescription for physical therapy was provided as well. They will work with him at this time and restoring his motion and in a month can begin progressive strengthening. I will see him back for reevaluation in 2 months but he may return or call earlier with any questions or concerns.

## 2022-07-08 ENCOUNTER — HOSPITAL ENCOUNTER (OUTPATIENT)
Dept: MRI IMAGING | Age: 62
Discharge: HOME | End: 2022-07-10
Payer: COMMERCIAL

## 2022-07-08 DIAGNOSIS — Z00.00 WELL ADULT EXAM: ICD-10-CM

## 2022-07-08 DIAGNOSIS — L40.9 PSORIASIS: ICD-10-CM

## 2022-07-08 DIAGNOSIS — F32.1 CURRENT MODERATE EPISODE OF MAJOR DEPRESSIVE DISORDER WITHOUT PRIOR EPISODE (HCC): ICD-10-CM

## 2022-07-08 DIAGNOSIS — E55.9 VITAMIN D DEFICIENCY DISEASE: ICD-10-CM

## 2022-07-08 DIAGNOSIS — Z28.21 IMMUNIZATION NOT CARRIED OUT BECAUSE OF PATIENT REFUSAL: ICD-10-CM

## 2022-07-08 DIAGNOSIS — R94.31 ABNORMAL EKG: ICD-10-CM

## 2022-07-08 DIAGNOSIS — K43.9 VENTRAL HERNIA WITHOUT OBSTRUCTION OR GANGRENE: ICD-10-CM
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
## 2022-07-08 PROCEDURE — 70551 MRI BRAIN STEM W/O DYE: CPT

## 2022-07-13 ENCOUNTER — TELEPHONE (OUTPATIENT)
Dept: UROLOGY | Age: 62
End: 2022-07-13

## 2022-07-13 NOTE — TELEPHONE ENCOUNTER
Patient's spouse, Soco Kowalski  called stating that patient had another PSA drawn recently and it was elevated again. Soco Kowalski scheduled patient an appointment for 08/19/22 ( first available). Patient's spouse states that patient was unable to do MRI due to patient feeling claustrophobic. Soco Kowalski was informed a message will be sent to MA, who will speak with Dr. Sola Deutsch when back in office.

## 2022-07-20 NOTE — TELEPHONE ENCOUNTER
Per Dr. Allegra Casarez patient can continue with MRI and be prescribed a valium to help. If patient would not like to do that Dr. Allegra Casarez would like patient to have a saturation biopsy.

## 2022-07-21 ENCOUNTER — HOSPITAL ENCOUNTER (OUTPATIENT)
Dept: VASCULAR LAB | Age: 62
Discharge: HOME | End: 2022-07-21
Payer: COMMERCIAL

## 2022-07-21 ENCOUNTER — HOSPITAL ENCOUNTER (OUTPATIENT)
Dept: NON INVASIVE DIAGNOSTICS | Age: 62
Discharge: HOME | End: 2022-07-21
Payer: COMMERCIAL

## 2022-07-21 DIAGNOSIS — Z00.00 WELL ADULT EXAM: ICD-10-CM

## 2022-07-21 DIAGNOSIS — R94.31 ABNORMAL EKG: ICD-10-CM

## 2022-07-21 DIAGNOSIS — E55.9 VITAMIN D DEFICIENCY, UNSPECIFIED: ICD-10-CM

## 2022-07-21 DIAGNOSIS — K43.9 VENTRAL HERNIA WITHOUT OBSTRUCTION OR GANGRENE: ICD-10-CM

## 2022-07-21 DIAGNOSIS — F32.1 CURRENT MODERATE EPISODE OF MAJOR DEPRESSIVE DISORDER WITHOUT PRIOR EPISODE (HCC): ICD-10-CM
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
## 2022-07-21 DIAGNOSIS — E66.3 OVERWEIGHT: ICD-10-CM

## 2022-07-21 DIAGNOSIS — R06.83 SNORING: ICD-10-CM

## 2022-07-21 DIAGNOSIS — Z28.21 VACCINATION NOT CARRIED OUT BECAUSE OF PATIENT REFUSAL: ICD-10-CM

## 2022-07-21 DIAGNOSIS — R53.83 FATIGUE, UNSPECIFIED TYPE: ICD-10-CM

## 2022-07-21 DIAGNOSIS — R41.840 DIFFICULTY CONCENTRATING: ICD-10-CM

## 2022-07-21 DIAGNOSIS — Z00.00 ENCOUNTER FOR GENERAL ADULT MEDICAL EXAMINATION W/O ABNORMAL FINDINGS: ICD-10-CM

## 2022-07-21 DIAGNOSIS — L40.9 PSORIASIS: ICD-10-CM

## 2022-07-21 DIAGNOSIS — Z00.00 WELLNESS EXAMINATION: ICD-10-CM

## 2022-07-21 DIAGNOSIS — Z00.00 ENCOUNTER FOR GENERAL ADULT MEDICAL EXAMINATION WITHOUT ABNORMAL FINDINGS: ICD-10-CM
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
## 2022-07-21 DIAGNOSIS — R09.89 BILATERAL CAROTID BRUITS: ICD-10-CM

## 2022-07-21 DIAGNOSIS — Z28.21 IMMUNIZATION NOT CARRIED OUT BECAUSE OF PATIENT REFUSAL: ICD-10-CM

## 2022-07-21 DIAGNOSIS — E66.3 OVER WEIGHT: ICD-10-CM

## 2022-07-21 LAB
LV EF: 55 %
LVEF MODALITY: NORMAL

## 2022-07-21 PROCEDURE — 93306 TTE W/DOPPLER COMPLETE: CPT

## 2022-07-21 PROCEDURE — 93880 EXTRACRANIAL BILAT STUDY: CPT

## 2022-07-21 NOTE — TELEPHONE ENCOUNTER
Patients' wife was returning call. She stated \"Demond does not want to take valium. Is there anywhere he can go to have the MRI done in an opening? \"    Writer faxed order to [address] [place] [address]

## 2022-07-21 NOTE — TELEPHONE ENCOUNTER
TRENTON for patient to call back to give message per  [address] [facility] [address].

## 2022-07-27 NOTE — TELEPHONE ENCOUNTER
Patients' wife called back in and stated \"It has been a hassle trying to find somewhere to have this MRI done. I know I said he didn't want the valium. But, at this point he would take it to calm him down. Can you send the order to  or can you ask  about any recommendations on where he can have the MRI done.  told him that they could not do it last time due to his total hip replacement. \"    Writer let patient know that Scarlett Cavazos is back in the office on Friday and we will speak with him then.

## 2022-07-29 DIAGNOSIS — R97.20 ELEVATED PSA: Primary | ICD-10-CM

## 2022-07-29 RX ORDER — DIAZEPAM 10 MG/1
10 TABLET ORAL ONCE
Qty: 1 TABLET | Refills: 0 | Status: SHIPPED | OUTPATIENT
Start: 2022-07-29 | End: 2022-07-29

## 2022-08-23 DIAGNOSIS — R97.20 ELEVATED PSA: Primary | ICD-10-CM

## 2022-08-30 ENCOUNTER — HOSPITAL ENCOUNTER (OUTPATIENT)
Age: 62
Setting detail: SPECIMEN
Discharge: HOME | End: 2022-08-30

## 2022-08-30 LAB
ALBUMIN SERPL-MCNC: 4 G/DL (ref 3.5–5.2)
ALBUMIN/GLOBULIN RATIO: 1.5 (ref 1–2.5)
ALP BLD-CCNC: 80 U/L (ref 40–129)
ALT SERPL-CCNC: 10 U/L (ref 5–41)
AST SERPL-CCNC: 18 U/L
BILIRUB SERPL-MCNC: 0.35 MG/DL (ref 0.3–1.2)
BILIRUBIN DIRECT: <0.08 MG/DL
BILIRUBIN, INDIRECT: NORMAL MG/DL (ref 0–1)
TOTAL PROTEIN: 6.6 G/DL (ref 6.4–8.3)

## 2022-09-12 ENCOUNTER — OFFICE VISIT (OUTPATIENT)
Dept: ORTHOPEDIC SURGERY | Age: 62
End: 2022-09-12

## 2022-09-12 VITALS — WEIGHT: 223 LBS | HEIGHT: 67 IN | BODY MASS INDEX: 35 KG/M2 | RESPIRATION RATE: 14 BRPM

## 2022-09-12 DIAGNOSIS — S42.035D NONDISPLACED FRACTURE OF LATERAL END OF LEFT CLAVICLE, SUBSEQUENT ENCOUNTER FOR FRACTURE WITH ROUTINE HEALING: Primary | ICD-10-CM

## 2022-09-12 PROCEDURE — 99024 POSTOP FOLLOW-UP VISIT: CPT | Performed by: ORTHOPAEDIC SURGERY

## 2022-09-12 RX ORDER — ROSUVASTATIN CALCIUM 10 MG/1
TABLET, COATED ORAL
COMMUNITY

## 2022-09-12 RX ORDER — ERGOCALCIFEROL 1.25 MG/1
CAPSULE ORAL
COMMUNITY
Start: 2022-06-28

## 2022-09-12 RX ORDER — DONEPEZIL HYDROCHLORIDE 5 MG/1
TABLET, FILM COATED ORAL
COMMUNITY

## 2022-09-12 NOTE — PROGRESS NOTES
HPI: Mr. Sydni Ley is a 28-year-old approximately 3 months status post a left distal clavicle fracture that has been managed conservatively. He states that he continues to do well and really has no pain in the shoulder and is able to use the arm for everything that he wants to. Physical examination:  Evaluation of patient's left shoulder and upper extremity demonstrates intact skin without warmth, erythema, or notable swelling. He is nontender to palpation along the length of the clavicle. He has approximately 140 degrees of active shoulder elevation 160 degrees of abduction and 75 degrees of external rotation which is symmetric to contralateral side. He has 5/5 muscle strength with resisted deltoid, supraspinatus, external rotation and internal rotation testing. Imaging studies:  2 x-ray views of the left clavicle completed on 9/12/2022 were reviewed independently demonstrating a distal clavicle fracture that remains in acceptable alignment and unchanged in alignment compared to previous x-rays. There does appear to be interval increase in consolidation and callus formation across the fracture site which still remains visible. Impression and plan: Mr. Sydni Ley is a 28-year-old approximately 3 months out from a closed left distal clavicle fracture. He is doing very well clinically. There has been some progressive healing noted radiographically but the fracture site remains visible at this time. I did explain to the patient that this may overtime gradually get filled in  as the bone consolidates. If it does not then he would technically have a nonunion. Either way the will be asymptomatic and so no additional treatment is necessary. He can continue on with his regular activities and he was encouraged to keep up with his home exercise program from physical therapy. I will see him back in my clinic as needed but he may return or call at anytime with questions or concerns.

## 2022-09-13 ENCOUNTER — OFFICE VISIT (OUTPATIENT)
Dept: UROLOGY | Age: 62
End: 2022-09-13
Payer: COMMERCIAL

## 2022-09-13 VITALS
HEIGHT: 67 IN | SYSTOLIC BLOOD PRESSURE: 124 MMHG | BODY MASS INDEX: 35 KG/M2 | DIASTOLIC BLOOD PRESSURE: 84 MMHG | WEIGHT: 223 LBS | TEMPERATURE: 98 F

## 2022-09-13 DIAGNOSIS — R97.20 ELEVATED PSA: Primary | ICD-10-CM

## 2022-09-13 DIAGNOSIS — Z80.42 FAMILY HISTORY OF PROSTATE CANCER IN FATHER: ICD-10-CM

## 2022-09-13 DIAGNOSIS — R39.12 WEAK URINARY STREAM: ICD-10-CM

## 2022-09-13 PROCEDURE — 99214 OFFICE O/P EST MOD 30 MIN: CPT | Performed by: UROLOGY

## 2022-09-13 RX ORDER — DIAZEPAM 10 MG/1
TABLET ORAL
COMMUNITY
Start: 2022-07-29

## 2022-09-13 ASSESSMENT — ENCOUNTER SYMPTOMS
COUGH: 0
EYE PAIN: 0
BACK PAIN: 0
SHORTNESS OF BREATH: 0
ABDOMINAL PAIN: 0
NAUSEA: 0
WHEEZING: 0
DIARRHEA: 0
EYE REDNESS: 0
CONSTIPATION: 0
VOMITING: 0

## 2022-09-13 NOTE — PROGRESS NOTES
Review of Systems   Constitutional:  Negative for chills, fatigue and fever. Eyes:  Negative for pain, redness and visual disturbance. Respiratory:  Negative for cough, shortness of breath and wheezing. Cardiovascular:  Negative for chest pain and leg swelling. Gastrointestinal:  Negative for abdominal pain, constipation, diarrhea, nausea and vomiting. Genitourinary:  Negative for difficulty urinating, dysuria, flank pain, frequency, hematuria, scrotal swelling, testicular pain and urgency. Musculoskeletal:  Negative for back pain, joint swelling and myalgias. Skin:  Negative for rash and wound. Neurological:  Negative for dizziness, tremors and numbness. Hematological:  Does not bruise/bleed easily.

## 2022-09-13 NOTE — LETTER
[address] [address]  Dept: [phone]  Dept Fax: [phone]        9/13/22    Patient: Rigo Ramirez  YOB: 1960    Dear Emily Scott DO,    I had the pleasure of seeing one of your patients, Armani Maynard today in the office today. Below are the relevant portions of my assessment and plan of care. IMPRESSION:  1. Elevated PSA    2. Weak urinary stream    3. Family history of prostate cancer in father        PLAN:  He is doing well. MRI was PI RADS 2  Has BPH, but no voiding complaints. F/U in June with a PSA. Return in about 9 months (around 6/13/2023). Prescriptions Ordered:  No orders of the defined types were placed in this encounter. Orders Placed:  No orders of the defined types were placed in this encounter. Thank you for allowing me to participate in the care of this patient. I will keep you updated on this patient's follow up and I look forward to serving you and your patients again in the future.         Jannet Knight MD

## 2022-09-13 NOTE — PROGRESS NOTES
[address] [address]  Dept: [address] [facility] Urology Office Note - Established    Patient:  Rigo Ramirez  YOB: 1960  Date: 9/13/2022    The patient is a 64 y.o. male who presents todayfor evaluation of the following problems:   Chief Complaint   Patient presents with    Elevated PSA       HPI  He is here in follow up for elevated PSA. He had a Fusion biopsy done 6 years ago, which was negative. He has had 3 negative biopsies. MRI was negative at this time. Volume was 60ml. He had been on Flomax and Uroxatral, but he is not taking either one. Summary of old records: N/A    Additional History: N/A    Procedures Today: N/A    Urinalysis today:  No results found for this visit on 09/13/22. Last several PSA's:  Lab Results   Component Value Date    PSA 8.03 (H) 06/23/2022    PSA 8.64 (H) 11/18/2021    PSA 7.37 (H) 07/29/2021     Last total testosterone:  No results found for: TESTOSTERONE    AUA Symptom Score (9/13/2022): Last BUN and creatinine:  Lab Results   Component Value Date    BUN 18 06/23/2022     Lab Results   Component Value Date    CREATININE 0.69 (L) 06/23/2022       Additional Lab/Culture results: none    Imaging Reviewed during this Office Visit: MRI images reviewed and negative.    (results were independently reviewed by physician and radiology report verified)    PAST MEDICAL, FAMILY AND SOCIAL HISTORY UPDATE:  Past Medical History:   Diagnosis Date    Abnormal EKG 2004    BPH (benign prostatic hyperplasia)     Closed left hip fracture (HCC)     Hx of fall down basement steps    Elevated blood-pressure reading without diagnosis of hypertension     Elevated PSA     Incomplete right bundle branch block     Per EKG 4-14-21    Psoriasis     Ventral hernia     Vitamin D deficiency      Past Surgical History:   Procedure Laterality Date    CARDIAC CATHETERIZATION  2004    for abnormal EKG, No clots    FINGER TRIGGER RELEASE Right     JOINT REPLACEMENT Left     hip    KNEE SURGERY  2001    R/t torn meniscus- had arthroscopy done- not sure what knee    PROSTATE BIOPSY  06/10/2016    with ultrasound- has had multiple bx's of prostate     Family History   Problem Relation Age of Onset    Heart Disease Mother     Prostate Cancer Father     High Blood Pressure Father     Diabetes Father     Heart Disease Father     Stroke Paternal Grandfather      Outpatient Medications Marked as Taking for the 9/13/22 encounter (Office Visit) with Celina Mancia MD   Medication Sig Dispense Refill    diazePAM (VALIUM) 10 MG tablet TAKE 1 TABLET BY MOUTH ONCE 30 MINUTES BEFORE APPOINTMENT FOR 1 DOSE      rosuvastatin (CRESTOR) 10 MG tablet 1 tablet      donepezil (ARICEPT) 5 MG tablet 1 tablet at bedtime      vitamin D (ERGOCALCIFEROL) 1.25 MG (59668 UT) CAPS capsule 1 capsule      Aspirin 81 MG CAPS 1 tablet      alfuzosin (UROXATRAL) 10 MG extended release tablet Take 1 tablet by mouth daily 30 tablet 11    tamsulosin (FLOMAX) 0.4 MG capsule Take 1 capsule by mouth daily Take one capsule daily to facilitate passage of ureteral stone 30 capsule 5       Patient has no known allergies. Social History     Tobacco Use   Smoking Status Every Day    Packs/day: 0.75    Years: 40.00    Pack years: 30.00    Types: Cigarettes    Start date: 5/27/1975   Smokeless Tobacco Never     (Ifpatient a smoker, smoking cessation counseling offered)    Social History     Substance and Sexual Activity   Alcohol Use No    Alcohol/week: 0.0 standard drinks       REVIEW OF SYSTEMS:  Review of Systems    Physical Exam:      Vitals:    09/13/22 1148   BP: 124/84   Temp: 98 °F (36.7 °C)     Body mass index is 34.93 kg/m². Patient is a 64 y.o. male in no acute distress and alert and oriented to person, place and time.   Physical Exam  Constitutional: Patient in no acute distress. Neuro: Alert and oriented to person, place and time. Psych: Mood normal, affect normal  Lungs: Respiratory effort is normal  Cardiovascular: Warm & Pink  Abdomen: Soft, non-tender, non-distended with no CVA,  No flank tenderness,  Or hepatosplenomegaly   Lymphatics: No palpablelymphadenopathy. Assessment and Plan      1. Elevated PSA    2. Weak urinary stream    3. Family history of prostate cancer in father           Plan:         He is doing well. MRI was PI RADS 2  Has BPH, but no voiding complaints. F/U in June with a PSA. Return in about 9 months (around 6/13/2023). Prescriptions Ordered:  No orders of the defined types were placed in this encounter. Orders Placed:  No orders of the defined types were placed in this encounter. Gen Romano MD    Agree with the ROS entered by the MA.

## 2022-10-06 ENCOUNTER — HOSPITAL ENCOUNTER (OUTPATIENT)
Age: 62
Setting detail: SPECIMEN
Discharge: HOME | End: 2022-10-06

## 2022-10-07 LAB
ALBUMIN SERPL-MCNC: 3.8 G/DL (ref 3.5–5.2)
ALBUMIN/GLOBULIN RATIO: 1.2 (ref 1–2.5)
ALP BLD-CCNC: 82 U/L (ref 40–129)
ALT SERPL-CCNC: 11 U/L (ref 5–41)
AST SERPL-CCNC: 14 U/L
BILIRUB SERPL-MCNC: 0.4 MG/DL (ref 0.3–1.2)
BILIRUBIN DIRECT: 0.1 MG/DL
BILIRUBIN, INDIRECT: 0.3 MG/DL (ref 0–1)
CHOLESTEROL, FASTING: 127 MG/DL
CHOLESTEROL/HDL RATIO: 3.4
HDLC SERPL-MCNC: 37 MG/DL
LDL CHOLESTEROL: 79 MG/DL (ref 0–130)
TOTAL PROTEIN: 6.9 G/DL (ref 6.4–8.3)
TRIGLYCERIDE, FASTING: 54 MG/DL

## 2023-06-07 ENCOUNTER — HOSPITAL ENCOUNTER (OUTPATIENT)
Age: 63
Setting detail: SPECIMEN
Discharge: HOME | End: 2023-06-07

## 2023-06-07 DIAGNOSIS — R97.20 ELEVATED PSA: ICD-10-CM

## 2023-06-07 LAB — PSA SERPL-MCNC: 7.72 NG/ML

## 2023-07-23 ENCOUNTER — APPOINTMENT (OUTPATIENT)
Dept: MRI IMAGING | Age: 63
DRG: 069 | End: 2023-07-23
Payer: COMMERCIAL

## 2023-07-23 ENCOUNTER — APPOINTMENT (OUTPATIENT)
Dept: CT IMAGING | Age: 63
DRG: 069 | End: 2023-07-23
Payer: COMMERCIAL

## 2023-07-23 ENCOUNTER — HOSPITAL ENCOUNTER (INPATIENT)
Age: 63
LOS: 2 days | Discharge: HOME | DRG: 069 | End: 2023-07-25
Attending: EMERGENCY MEDICINE | Admitting: PSYCHIATRY & NEUROLOGY
Payer: COMMERCIAL

## 2023-07-23 DIAGNOSIS — G45.9 TIA (TRANSIENT ISCHEMIC ATTACK): Primary | ICD-10-CM

## 2023-07-23 LAB
ANION GAP SERPL CALCULATED.3IONS-SCNC: 13 MMOL/L (ref 9–17)
BASOPHILS # BLD: 0.05 K/UL (ref 0–0.2)
BASOPHILS NFR BLD: 1 % (ref 0–2)
BUN BLD-MCNC: 11 MG/DL (ref 8–26)
BUN SERPL-MCNC: 13 MG/DL (ref 8–23)
CA-I BLD-SCNC: 1.24 MMOL/L (ref 1.15–1.33)
CALCIUM SERPL-MCNC: 9.4 MG/DL (ref 8.6–10.4)
CHLORIDE BLD-SCNC: 105 MMOL/L (ref 98–107)
CHLORIDE SERPL-SCNC: 105 MMOL/L (ref 98–107)
CK SERPL-CCNC: 173 U/L (ref 39–308)
CO2 BLD CALC-SCNC: 26 MMOL/L (ref 22–30)
CO2 SERPL-SCNC: 21 MMOL/L (ref 20–31)
CREAT SERPL-MCNC: 0.8 MG/DL (ref 0.7–1.2)
EGFR, POC: >60 ML/MIN/1.73M2
EOSINOPHIL # BLD: 0.32 K/UL (ref 0–0.44)
EOSINOPHILS RELATIVE PERCENT: 4 % (ref 1–4)
ERYTHROCYTE [DISTWIDTH] IN BLOOD BY AUTOMATED COUNT: 13.1 % (ref 11.8–14.4)
GFR SERPL CREATININE-BSD FRML MDRD: >60 ML/MIN/1.73M2
GLUCOSE BLD-MCNC: 110 MG/DL (ref 74–100)
GLUCOSE SERPL-MCNC: 120 MG/DL (ref 70–99)
HCO3 VENOUS: 26.1 MMOL/L (ref 22–29)
HCT VFR BLD AUTO: 46.4 % (ref 40.7–50.3)
HCT VFR BLD AUTO: 53 % (ref 41–53)
HGB BLD-MCNC: 15.4 G/DL (ref 13–17)
IMM GRANULOCYTES # BLD AUTO: <0.03 K/UL (ref 0–0.3)
IMM GRANULOCYTES NFR BLD: 0 %
INR PPP: 1.1
LYMPHOCYTES NFR BLD: 2.27 K/UL (ref 1.1–3.7)
LYMPHOCYTES RELATIVE PERCENT: 31 % (ref 24–43)
MCH RBC QN AUTO: 31 PG (ref 25.2–33.5)
MCHC RBC AUTO-ENTMCNC: 33.2 G/DL (ref 28.4–34.8)
MCV RBC AUTO: 93.4 FL (ref 82.6–102.9)
MONOCYTES NFR BLD: 0.75 K/UL (ref 0.1–1.2)
MONOCYTES NFR BLD: 10 % (ref 3–12)
MYOGLOBIN SERPL-MCNC: 54 NG/ML (ref 28–72)
NEUTROPHILS NFR BLD: 54 % (ref 36–65)
NEUTS SEG NFR BLD: 3.91 K/UL (ref 1.5–8.1)
NRBC BLD-RTO: 0 PER 100 WBC
O2 SAT, VEN: 84.2 % (ref 60–85)
PARTIAL THROMBOPLASTIN TIME: 25.7 SEC (ref 23–36.5)
PCO2, VEN: 40.2 MM HG (ref 41–51)
PH VENOUS: 7.42 (ref 7.32–7.43)
PLATELET # BLD AUTO: 173 K/UL (ref 138–453)
PMV BLD AUTO: 11.5 FL (ref 8.1–13.5)
PO2, VEN: 48 MM HG (ref 30–50)
POC ANION GAP: 13 MMOL/L (ref 7–16)
POC CREATININE: 0.7 MG/DL (ref 0.51–1.19)
POC HEMOGLOBIN (CALC): 17.9 G/DL (ref 13.5–17.5)
POC LACTIC ACID: 1.9 MMOL/L (ref 0.56–1.39)
POSITIVE BASE EXCESS, VEN: 1.5 MMOL/L (ref 0–3)
POTASSIUM BLD-SCNC: 3.7 MMOL/L (ref 3.5–4.5)
POTASSIUM SERPL-SCNC: 3.9 MMOL/L (ref 3.7–5.3)
PROTHROMBIN TIME: 13.6 SEC (ref 11.7–14.9)
RBC # BLD AUTO: 4.97 M/UL (ref 4.21–5.77)
SODIUM BLD-SCNC: 143 MMOL/L (ref 138–146)
SODIUM SERPL-SCNC: 139 MMOL/L (ref 135–144)
TROPONIN I SERPL HS-MCNC: 13 NG/L (ref 0–22)
WBC OTHER # BLD: 7.3 K/UL (ref 3.5–11.3)

## 2023-07-23 PROCEDURE — 82553 CREATINE MB FRACTION: CPT

## 2023-07-23 PROCEDURE — 6360000004 HC RX CONTRAST MEDICATION

## 2023-07-23 PROCEDURE — 85610 PROTHROMBIN TIME: CPT

## 2023-07-23 PROCEDURE — 82550 ASSAY OF CK (CPK): CPT

## 2023-07-23 PROCEDURE — 80051 ELECTROLYTE PANEL: CPT

## 2023-07-23 PROCEDURE — 6360000002 HC RX W HCPCS

## 2023-07-23 PROCEDURE — 93005 ELECTROCARDIOGRAM TRACING: CPT

## 2023-07-23 PROCEDURE — 85027 COMPLETE CBC AUTOMATED: CPT

## 2023-07-23 PROCEDURE — 82803 BLOOD GASES ANY COMBINATION: CPT

## 2023-07-23 PROCEDURE — 82947 ASSAY GLUCOSE BLOOD QUANT: CPT

## 2023-07-23 PROCEDURE — 70498 CT ANGIOGRAPHY NECK: CPT

## 2023-07-23 PROCEDURE — 83874 ASSAY OF MYOGLOBIN: CPT

## 2023-07-23 PROCEDURE — 6370000000 HC RX 637 (ALT 250 FOR IP)

## 2023-07-23 PROCEDURE — 2060000000 HC ICU INTERMEDIATE R&B

## 2023-07-23 PROCEDURE — 99285 EMERGENCY DEPT VISIT HI MDM: CPT

## 2023-07-23 PROCEDURE — 82330 ASSAY OF CALCIUM: CPT

## 2023-07-23 PROCEDURE — 84520 ASSAY OF UREA NITROGEN: CPT

## 2023-07-23 PROCEDURE — 83605 ASSAY OF LACTIC ACID: CPT

## 2023-07-23 PROCEDURE — 85730 THROMBOPLASTIN TIME PARTIAL: CPT

## 2023-07-23 PROCEDURE — 70551 MRI BRAIN STEM W/O DYE: CPT

## 2023-07-23 PROCEDURE — 85014 HEMATOCRIT: CPT

## 2023-07-23 PROCEDURE — 70450 CT HEAD/BRAIN W/O DYE: CPT

## 2023-07-23 PROCEDURE — 82565 ASSAY OF CREATININE: CPT

## 2023-07-23 PROCEDURE — 80048 BASIC METABOLIC PNL TOTAL CA: CPT

## 2023-07-23 PROCEDURE — 0042T CT BRAIN PERFUSION: CPT

## 2023-07-23 PROCEDURE — 84484 ASSAY OF TROPONIN QUANT: CPT

## 2023-07-23 RX ORDER — ONDANSETRON 4 MG/1
4 TABLET, ORALLY DISINTEGRATING ORAL EVERY 8 HOURS PRN
Status: DISCONTINUED | OUTPATIENT
Start: 2023-07-23 | End: 2023-07-25 | Stop reason: HOSPADM

## 2023-07-23 RX ORDER — ENOXAPARIN SODIUM 100 MG/ML
30 INJECTION SUBCUTANEOUS 2 TIMES DAILY
Status: DISCONTINUED | OUTPATIENT
Start: 2023-07-23 | End: 2023-07-25 | Stop reason: HOSPADM

## 2023-07-23 RX ORDER — ASPIRIN 300 MG/1
300 SUPPOSITORY RECTAL DAILY
Status: DISCONTINUED | OUTPATIENT
Start: 2023-07-23 | End: 2023-07-25 | Stop reason: HOSPADM

## 2023-07-23 RX ORDER — ONDANSETRON 2 MG/ML
4 INJECTION INTRAMUSCULAR; INTRAVENOUS EVERY 6 HOURS PRN
Status: DISCONTINUED | OUTPATIENT
Start: 2023-07-23 | End: 2023-07-25 | Stop reason: HOSPADM

## 2023-07-23 RX ORDER — ATORVASTATIN CALCIUM 80 MG/1
80 TABLET, FILM COATED ORAL NIGHTLY
Status: DISCONTINUED | OUTPATIENT
Start: 2023-07-23 | End: 2023-07-25 | Stop reason: HOSPADM

## 2023-07-23 RX ORDER — POLYETHYLENE GLYCOL 3350 17 G/17G
17 POWDER, FOR SOLUTION ORAL DAILY PRN
Status: DISCONTINUED | OUTPATIENT
Start: 2023-07-23 | End: 2023-07-25 | Stop reason: HOSPADM

## 2023-07-23 RX ORDER — ASPIRIN 81 MG/1
81 TABLET ORAL DAILY
Status: DISCONTINUED | OUTPATIENT
Start: 2023-07-23 | End: 2023-07-25 | Stop reason: HOSPADM

## 2023-07-23 RX ORDER — LEVETIRACETAM 10 MG/ML
1000 INJECTION INTRAVASCULAR ONCE
Status: COMPLETED | OUTPATIENT
Start: 2023-07-23 | End: 2023-07-24

## 2023-07-23 RX ORDER — LABETALOL HYDROCHLORIDE 5 MG/ML
10 INJECTION, SOLUTION INTRAVENOUS ONCE
Status: COMPLETED | OUTPATIENT
Start: 2023-07-23 | End: 2023-07-23

## 2023-07-23 RX ADMIN — ASPIRIN 81 MG: 81 TABLET, COATED ORAL at 20:39

## 2023-07-23 RX ADMIN — Medication 10 MG: at 21:51

## 2023-07-23 RX ADMIN — ATORVASTATIN CALCIUM 80 MG: 80 TABLET, FILM COATED ORAL at 20:39

## 2023-07-23 RX ADMIN — IOPAMIDOL 140 ML: 755 INJECTION, SOLUTION INTRAVENOUS at 16:55

## 2023-07-23 RX ADMIN — LEVETIRACETAM 1000 MG: 10 INJECTION, SOLUTION INTRAVENOUS at 23:55

## 2023-07-23 RX ADMIN — ENOXAPARIN SODIUM 30 MG: 30 INJECTION SUBCUTANEOUS at 20:39

## 2023-07-23 ASSESSMENT — ENCOUNTER SYMPTOMS
SHORTNESS OF BREATH: 0
CHEST TIGHTNESS: 0

## 2023-07-23 NOTE — CONSULTS
Department of Neurology/Telestroke/Stroke  Resident Consult Note  Stroke Alert @ 6332  [id number] at bedside @ [phone]    Reason for Consult:  ED Stroke Consult  Requesting Physician:  EMS  Endovascular Neurosurgeon:   Tutu Cornelius MD    Stroke Team:  Josephine Moody MD      History Obtained From:  patient, spouse    CHIEF COMPLAINT:       Weakness, aphasia    HISTORY OF PRESENT ILLNESS:       26-year-old male no past history of stroke, on baby aspirin no anticoagulation initially started having symptoms of drooling and right-sided weakness with possible right-sided ataxia at 1520 (LKW 1520), and EMS was called at  EMS saw that the patient's symptoms had resolved. Initial +, glucose 100+. However, as they are examining him they noticed his symptoms returning, patient started experiencing aphasia, right-sided weakness more pronounced in the right hand. Patient was then rushed to the ER and seen at 1640. When the patient initially examined he had an NIH of 1 due to mild aphasia, however wife confirmed that he has baseline difficulty speaking. Otherwise patient had no neurological symptoms. CT head without contrast showed no acute hemorrhage, and CTP and CTA showed no notable blockages. The patient will not be given TNK due to return to baseline.        LKW: 1520  NIHSS: 1  Modified [place] Scale:0  SBP: 200+  Glucose: 100+  CT head without contrast: -ve  TNK: not given  Endovascular: /       PAST MEDICAL HISTORY :       Past Medical History:        Diagnosis Date    Abnormal EKG 2004    BPH (benign prostatic hyperplasia)     Closed left hip fracture (HCC)     Hx of fall down basement steps    Elevated blood-pressure reading without diagnosis of hypertension     Elevated PSA     Incomplete right bundle branch block     Per EKG 4-14-21    Psoriasis     Ventral hernia     Vitamin D deficiency        Past Surgical History:        Procedure Laterality Date    CARDIAC CATHETERIZATION  2004    for abnormal EKG, No

## 2023-07-23 NOTE — ED PROVIDER NOTES
[facility] ED  Emergency Department Encounter  Emergency Medicine Resident     Pt Name:Demond Cabral  MRN: [id number]  [address] [facility] 1960  Date of evaluation: 7/23/23  PCP:  Margarita Logan DO  Note Started: 4:34 PM EDT      CHIEF COMPLAINT       Chief Complaint   Patient presents with    Cerebrovascular Accident       HISTORY OF PRESENT ILLNESS  (Location/Symptom, Timing/Onset, Context/Setting, Quality, Duration, Modifying Factors, Severity.)      Lucio Cohn is a 58 y.o. male who presents with aphasia, right arm dysmetria. Patient states is having around 3:30 PM he was talking with his wife. Patient states he was talking to his wife when he suddenly could not talk anymore. He also states that his wife said he was drooling out of his mouth. He then went inside to play on his iPad and could not have the appropriate icons. He then called EMS. EMS states he had multiple episodes of aphasia in the ambulance, but that he could understand EMS the entire time. Upon presentation, patient had some episodes of aphasia while giving history. He presents for further evaluation from EMS and a stroke alert was called. PAST MEDICAL / SURGICAL / SOCIAL / FAMILY HISTORY      has a past medical history of Abnormal EKG, BPH (benign prostatic hyperplasia), Closed left hip fracture (HCC), Elevated blood-pressure reading without diagnosis of hypertension, Elevated PSA, Incomplete right bundle branch block, Psoriasis, Ventral hernia, and Vitamin D deficiency. has a past surgical history that includes knee surgery (2001); Prostate biopsy (06/10/2016); Cardiac catheterization (2004); joint replacement (Left); and Finger trigger release (Right).       Social History     Socioeconomic History    Marital status:      Spouse name: Fermin foy    Number of children: 2    Years of education: Not on file    Highest education level: Not on file   Occupational History    Occupation: construction

## 2023-07-23 NOTE — PROGRESS NOTES
[address]     Emergency/Trauma Note    PATIENT NAME: Dann Cabral    Shift date: 07/23/23    Shift day: Sunday   Shift # 2    Room # 20/20   Name: Marta Fuller            Age: 58 y.o. Gender: male          Christianity: [employer]     Trauma/Incident type:  RACE  Admit Date & Time: 7/23/2023  4:18 PM    ADVANCE DIRECTIVES IN CHART? No    NAME OF DECISION MAKER: unless documented elsewhere, Lyla Trejo    RELATIONSHIP OF DECISION MAKER TO PATIENT: spouse    PATIENT/EVENT DESCRIPTION:  Marta Fuller is a 58 y.o. male who arrived at the ED; Pt to be admitted to 20/20. SPIRITUAL ASSESSMENT-INTERVENTION-OUTCOME:   connected with patient and observed him to be calm and coping well.  connected with patient's wife and observed her to be anxious and coping well.  provided a non-anxious presence, provided a non-anxious presence, and facilitated connection between patient and his wife. Patient and his wife seemed receptive to  presence, patient's wife became less anxious during visit, and both expressed gratitude for  support. PATIENT BELONGINGS:  No belongings noted    ANY BELONGINGS OF SIGNIFICANT VALUE NOTED:  Not noted by this . REGISTRATION STAFF NOTIFIED? Yes      WHAT IS YOUR SPIRITUAL CARE PLAN FOR THIS PATIENT?:    support will continue to be available as needed. 07/23/23 1651   Encounter Summary   Service Provided For: Patient; Family   Referral/Consult From: Multi-disciplinary team   Support System Spouse   Last Encounter  07/23/23   Complexity of Encounter Moderate   Begin Time 1630   End Time  1645   Total Time Calculated 15 min   Crisis   Type   (RACE)   Assessment/Intervention/Outcome   Assessment Calm;Coping; Anxious   Intervention Sustaining Presence/Ministry of presence   Outcome Receptive; Less anxious, Less agitated;Engaged in conversation;Expressed Gratitude         Electronically signed

## 2023-07-24 ENCOUNTER — APPOINTMENT (OUTPATIENT)
Dept: GENERAL RADIOLOGY | Age: 63
DRG: 069 | End: 2023-07-24
Payer: COMMERCIAL

## 2023-07-24 LAB
CHOLEST SERPL-MCNC: 121 MG/DL
CHOLESTEROL/HDL RATIO: 3.2
EKG ATRIAL RATE: 92 BPM
EKG P AXIS: 59 DEGREES
EKG P-R INTERVAL: 238 MS
EKG Q-T INTERVAL: 378 MS
EKG QRS DURATION: 98 MS
EKG QTC CALCULATION (BAZETT): 467 MS
EKG R AXIS: -2 DEGREES
EKG T AXIS: 56 DEGREES
EKG VENTRICULAR RATE: 92 BPM
ERYTHROCYTE [DISTWIDTH] IN BLOOD BY AUTOMATED COUNT: 13.1 % (ref 11.8–14.4)
EST. AVERAGE GLUCOSE BLD GHB EST-MCNC: 117 MG/DL
HBA1C MFR BLD: 5.7 % (ref 4–6)
HCT VFR BLD AUTO: 45.2 % (ref 40.7–50.3)
HDLC SERPL-MCNC: 38 MG/DL
HGB BLD-MCNC: 15 G/DL (ref 13–17)
LDLC SERPL CALC-MCNC: 69 MG/DL (ref 0–130)
MCH RBC QN AUTO: 31.1 PG (ref 25.2–33.5)
MCHC RBC AUTO-ENTMCNC: 33.2 G/DL (ref 28.4–34.8)
MCV RBC AUTO: 93.8 FL (ref 82.6–102.9)
NRBC BLD-RTO: 0 PER 100 WBC
PLATELET # BLD AUTO: 169 K/UL (ref 138–453)
PMV BLD AUTO: 11.5 FL (ref 8.1–13.5)
RBC # BLD AUTO: 4.82 M/UL (ref 4.21–5.77)
TRIGL SERPL-MCNC: 71 MG/DL
WBC OTHER # BLD: 7.6 K/UL (ref 3.5–11.3)

## 2023-07-24 PROCEDURE — 2060000000 HC ICU INTERMEDIATE R&B

## 2023-07-24 PROCEDURE — 92523 SPEECH SOUND LANG COMPREHEN: CPT

## 2023-07-24 PROCEDURE — 6370000000 HC RX 637 (ALT 250 FOR IP): Performed by: STUDENT IN AN ORGANIZED HEALTH CARE EDUCATION/TRAINING PROGRAM

## 2023-07-24 PROCEDURE — 85027 COMPLETE CBC AUTOMATED: CPT

## 2023-07-24 PROCEDURE — 6370000000 HC RX 637 (ALT 250 FOR IP)

## 2023-07-24 PROCEDURE — 36415 COLL VENOUS BLD VENIPUNCTURE: CPT

## 2023-07-24 PROCEDURE — 71045 X-RAY EXAM CHEST 1 VIEW: CPT

## 2023-07-24 PROCEDURE — 80061 LIPID PANEL: CPT

## 2023-07-24 PROCEDURE — 95816 EEG AWAKE AND DROWSY: CPT | Performed by: PSYCHIATRY & NEUROLOGY

## 2023-07-24 PROCEDURE — 83036 HEMOGLOBIN GLYCOSYLATED A1C: CPT

## 2023-07-24 PROCEDURE — 95816 EEG AWAKE AND DROWSY: CPT

## 2023-07-24 PROCEDURE — 6360000002 HC RX W HCPCS

## 2023-07-24 RX ORDER — CLOPIDOGREL BISULFATE 75 MG/1
75 TABLET ORAL DAILY
Status: DISCONTINUED | OUTPATIENT
Start: 2023-07-24 | End: 2023-07-25 | Stop reason: HOSPADM

## 2023-07-24 RX ADMIN — ASPIRIN 81 MG: 81 TABLET, COATED ORAL at 10:07

## 2023-07-24 RX ADMIN — ATORVASTATIN CALCIUM 80 MG: 80 TABLET, FILM COATED ORAL at 20:07

## 2023-07-24 RX ADMIN — ENOXAPARIN SODIUM 30 MG: 30 INJECTION SUBCUTANEOUS at 10:07

## 2023-07-24 RX ADMIN — CLOPIDOGREL BISULFATE 75 MG: 75 TABLET ORAL at 13:27

## 2023-07-24 RX ADMIN — ENOXAPARIN SODIUM 30 MG: 30 INJECTION SUBCUTANEOUS at 20:06

## 2023-07-24 NOTE — PLAN OF CARE
Notified primary service that patient has inspiratory/expiratory wheezing, diminish bases. Notified that patient stated he's smoked cigarettes for 40 plus years. See orders.

## 2023-07-24 NOTE — PROGRESS NOTES
Physical Therapy        Physical Therapy Cancel Note      DATE: 2023    NAME: Jabari Morrow  MRN: [id number]   : 1960      Patient not seen this date for Physical Therapy due to:    Patient is not appropriate for PT evaluation/treatment at this time d/t LTME being placed at bedside. PT will check back as time allows.       Electronically signed by Jerry Nam PT on 2023 at 11:23 AM

## 2023-07-24 NOTE — PROGRESS NOTES
SLP ALL NOTES  Facility/Department: [address] STEPDOWN  Initial Speech/Language/Cognitive Assessment    NAME: Ashli Rahman  : 1960   MRN: [id number]  ADMISSION DATE: 2023  ADMITTING DIAGNOSIS: has S/p left hip fracture and Transient ischemic attack on their problem list.      Date of Eval: 2023   Evaluating Therapist: LANA Jara    RECENT RESULTS  CT OF HEAD/MRI:   Per chart: MRI on 23  IMPRESSION:  No acute infarct    Primary Complaint:   Per chart:  History of Present Illness:      70-year-old male no past history of stroke, on baby aspirin no anticoagulation initially started having symptoms of drooling and right-sided weakness with possible right-sided ataxia at 1520 (LKW 1520), and EMS was called at  EMS saw that the patient's symptoms had resolved. Initial +, glucose 100+. However, as they are examining him they noticed his symptoms returning, patient started experiencing aphasia, right-sided weakness more pronounced in the right hand. Patient was then rushed to the ER and seen at 1640. When the patient initially examined he had an NIH of 1 due to mild aphasia,Otherwise patient had no neurological symptoms. CT head without contrast showed no acute hemorrhage, and CTP and CTA showed no notable blockages. The patient will not be given TNK due to return to baseline. Pain:  Pain Assessment  Pain Assessment: None - Denies Pain    Vision/ Hearing  Vision  Vision: Impaired  Vision Exceptions: Wears glasses for reading  Hearing  Hearing: Exceptions to [facility]  Hearing Exceptions: Hard of hearing/hearing concerns    Assessment:  Cognitive Diagnosis: Cognitive evaluation revealed functional memory, verbal problem solving and reasoning skills. Sustained attention appeared wfls at this time.   Aphasia Diagnosis: no aphasia  Speech Diagnosis: no dysarthria/apraxia observed  Communication Diagnosis: functional   Diagnosis: Speech/language and cognition appears wfls at Prognosis:  Individuals consulted  Consulted and agree with results and recommendations: Patient;RN;Family member  Family member consulted: wife    Education:  Patient Education: yes  Patient Education Response: Verbalizes understanding          Therapy Time:   Individual Concurrent Group Co-treatment   Time In 0935         Time Out 0561         Minutes 12                 Electronically signed by LANA Castellon M.A., [address] [facility]  on 7/24/2023 at 10:13 AM

## 2023-07-24 NOTE — H&P
[facility] Neurology   [address]    HISTORY AND PHYSICAL EXAMINATION            Date:   7/24/2023  Patient name:  Mata Whitley  Date of admission:  7/23/2023  4:18 PM  MRN:   [id number]  Account:  [de-identified]  YOB: 1960  PCP:    Anahy Sterling DO  Room:   [address] [place] [address]  Code Status:    Full Code    Chief Complaint:     Chief Complaint   Patient presents with    Cerebrovascular Accident       History Obtained From:     patient, electronic medical record      History of Present Illness:     69-year-old male no past history of stroke, on baby aspirin no anticoagulation initially started having symptoms of drooling and right-sided weakness with possible right-sided ataxia at 1520 (LKW 1520), and EMS was called at  EMS saw that the patient's symptoms had resolved. Initial +, glucose 100+. However, as they are examining him they noticed his symptoms returning, patient started experiencing aphasia, right-sided weakness more pronounced in the right hand. Patient was then rushed to the ER and seen at 1640. When the patient initially examined he had an NIH of 1 due to mild aphasia,Otherwise patient had no neurological symptoms. CT head without contrast showed no acute hemorrhage, and CTP and CTA showed no notable blockages. The patient will not be given TNK due to return to baseline.         LKW: 1520  NIHSS: 1  Modified [place] Scale:0  SBP: 200+  Glucose: 100+  CT head without contrast: -ve  TNK: not given  Endovascular:    Significant event on 7/23/2023:Patient was having intermittent expressive aphasia ,talked with the on call attneding,ordered eeg and loaded with kepra    Past Medical History:     Past Medical History:   Diagnosis Date    Abnormal EKG 2004    BPH (benign prostatic hyperplasia)     Closed left hip fracture (HCC)     Hx of fall down basement steps    Elevated blood-pressure reading without diagnosis of hypertension

## 2023-07-25 ENCOUNTER — TELEPHONE (OUTPATIENT)
Dept: UROLOGY | Age: 63
End: 2023-07-25

## 2023-07-25 VITALS
TEMPERATURE: 98 F | BODY MASS INDEX: 36.1 KG/M2 | HEIGHT: 67 IN | WEIGHT: 230 LBS | SYSTOLIC BLOOD PRESSURE: 146 MMHG | HEART RATE: 58 BPM | DIASTOLIC BLOOD PRESSURE: 97 MMHG | RESPIRATION RATE: 13 BRPM | OXYGEN SATURATION: 99 %

## 2023-07-25 DIAGNOSIS — R97.20 ELEVATED PSA: Primary | ICD-10-CM

## 2023-07-25 PROCEDURE — 97161 PT EVAL LOW COMPLEX 20 MIN: CPT

## 2023-07-25 PROCEDURE — 6370000000 HC RX 637 (ALT 250 FOR IP)

## 2023-07-25 PROCEDURE — 6360000002 HC RX W HCPCS

## 2023-07-25 PROCEDURE — 97530 THERAPEUTIC ACTIVITIES: CPT

## 2023-07-25 PROCEDURE — 6370000000 HC RX 637 (ALT 250 FOR IP): Performed by: STUDENT IN AN ORGANIZED HEALTH CARE EDUCATION/TRAINING PROGRAM

## 2023-07-25 PROCEDURE — 97165 OT EVAL LOW COMPLEX 30 MIN: CPT

## 2023-07-25 PROCEDURE — 99232 SBSQ HOSP IP/OBS MODERATE 35: CPT | Performed by: PSYCHIATRY & NEUROLOGY

## 2023-07-25 PROCEDURE — 97116 GAIT TRAINING THERAPY: CPT

## 2023-07-25 RX ORDER — ASPIRIN 81 MG/1
81 TABLET ORAL DAILY
Qty: 30 TABLET | Refills: 0 | Status: SHIPPED | OUTPATIENT
Start: 2023-07-26 | End: 2023-07-25 | Stop reason: SDUPTHER

## 2023-07-25 RX ORDER — ASPIRIN 81 MG/1
81 TABLET ORAL DAILY
Qty: 30 TABLET | Refills: 3 | Status: SHIPPED | OUTPATIENT
Start: 2023-07-26 | End: 2023-07-25 | Stop reason: SDUPTHER

## 2023-07-25 RX ORDER — ATORVASTATIN CALCIUM 80 MG/1
80 TABLET, FILM COATED ORAL NIGHTLY
Qty: 30 TABLET | Refills: 3 | Status: SHIPPED | OUTPATIENT
Start: 2023-07-25

## 2023-07-25 RX ORDER — CLOPIDOGREL BISULFATE 75 MG/1
75 TABLET ORAL DAILY
Qty: 30 TABLET | Refills: 4 | Status: SHIPPED | OUTPATIENT
Start: 2023-07-26 | End: 2023-12-23

## 2023-07-25 RX ORDER — ASPIRIN 81 MG/1
81 TABLET ORAL DAILY
Qty: 20 TABLET | Refills: 0 | Status: SHIPPED | OUTPATIENT
Start: 2023-07-26 | End: 2023-08-15

## 2023-07-25 RX ORDER — CLOPIDOGREL BISULFATE 75 MG/1
75 TABLET ORAL DAILY
Qty: 19 TABLET | Refills: 0 | Status: SHIPPED | OUTPATIENT
Start: 2023-07-26 | End: 2023-07-25 | Stop reason: SDUPTHER

## 2023-07-25 RX ADMIN — ASPIRIN 81 MG: 81 TABLET, COATED ORAL at 08:22

## 2023-07-25 RX ADMIN — CLOPIDOGREL BISULFATE 75 MG: 75 TABLET ORAL at 08:22

## 2023-07-25 RX ADMIN — ENOXAPARIN SODIUM 30 MG: 30 INJECTION SUBCUTANEOUS at 08:22

## 2023-07-25 NOTE — DISCHARGE INSTRUCTIONS
Take aspirin and plavix for 21 days  Than only take plavix and lipitor 80 once daily  Follow up with neurology in 1 week  Follow up with endovascular neurologist Dulce Adamson in 1 week  Follow up with PCP  rETURN TO ED IF SYMTOMPS WORSEN

## 2023-07-25 NOTE — PROCEDURES
EEG REPORT       Patient: Mata Whitley Age: 58 y.o. MRN: [id number]      Referring Provider: No ref. provider found    History: This routine 30 minute scalp EEG was recorded with video- monitoring for a 58 y.o.. male who presented with encephalopathy. This EEG was performed to evaluate for focal and epileptiform abnormalities. Demond Cabral   Current Facility-Administered Medications   Medication Dose Route Frequency Provider Last Rate Last Admin    nicotine (NICODERM CQ) 7 MG/24HR 1 patch  1 patch TransDERmal Daily Caity Geiger MD   1 patch at 07/24/23 1110    clopidogrel (PLAVIX) tablet 75 mg  75 mg Oral Daily Indra Morton MD   75 mg at 07/24/23 1327    ondansetron (ZOFRAN-ODT) disintegrating tablet 4 mg  4 mg Oral Q8H PRN Nydia Boswell MD        Or    ondansetron [facility]) injection 4 mg  4 mg IntraVENous Q6H PRN Nydia Boswell MD        polyethylene glycol (GLYCOLAX) packet 17 g  17 g Oral Daily PRN Nydia Boswell MD        enoxaparin Sodium (LOVENOX) injection 30 mg  30 mg SubCUTAneous BID Nydia Boswell MD   30 mg at 07/24/23 2006    aspirin EC tablet 81 mg  81 mg Oral Daily Nydia Boswell MD   81 mg at 07/24/23 1007    Or    aspirin suppository 300 mg  300 mg Rectal Daily Nydia Boswell MD        atorvastatin (LIPITOR) tablet 80 mg  80 mg Oral Nightly Nydia Boswell MD   80 mg at 07/24/23 2007       Technical Description: This is a 21 channel digital EEG recording with time-locked video. Electrodes were placed in accordance with the 10-20 International System of Electrode Placement. Single lead EKG monitoring as well as temporal electrodes were included. The patient was not sleep deprived. This recording was obtained during wakefulness.   EEG Description: The dominant background activity during maximal recorded wakefulness consisted of a clear posterior dominant rhythm of 9 to 10 Hz bilaterally symmetrical admixed

## 2023-07-25 NOTE — PLAN OF CARE
PIV d/c, reviewed d/c paperwork with patient and spouse. Patient will discharge home once meds-to-bed delivery completed. 1744pm meds-to bed delivered. Patient d/c home. Problem: Discharge Planning  Goal: Discharge to home or other facility with appropriate resources  7/25/2023 1725 by Claudell Kudo, RN  Outcome: Completed  7/25/2023 0627 by Birdie Gómez RN  Outcome: Progressing     Problem: Safety - Adult  Goal: Free from fall injury  7/25/2023 1725 by Claudell Kudo, RN  Outcome: Completed  7/25/2023 0627 by Birdie Gómez RN  Outcome: Progressing     Problem: Skin/Tissue Integrity  Goal: Absence of new skin breakdown  Description: 1. Monitor for areas of redness and/or skin breakdown  2. Assess vascular access sites hourly  3. Every 4-6 hours minimum:  Change oxygen saturation probe site  4. Every 4-6 hours:  If on nasal continuous positive airway pressure, respiratory therapy assess nares and determine need for appliance change or resting period.   7/25/2023 1725 by Claudell Kudo, RN  Outcome: Completed  7/25/2023 0627 by Birdie Gómez RN  Outcome: Progressing     Problem: Pain  Goal: Verbalizes/displays adequate comfort level or baseline comfort level  7/25/2023 1725 by Claudell Kudo, RN  Outcome: Completed  7/25/2023 0627 by Birdie Gómez RN  Outcome: Progressing

## 2023-07-25 NOTE — TELEPHONE ENCOUNTER
Pts wife called, Pt is currently admitted in the hospital, so we they are cancelling today's appointment 7.25.23 with Dr. John Ding. Pts with would like to know her husbands most current PSA levels.

## 2023-07-25 NOTE — PROGRESS NOTES
Physical Therapy  Facility/Department: [address] STEPDOWN  Physical Therapy Initial Assessment    Name: Mtizi Davis  : 1960  MRN: [id number]  Date of Service: 2023    Discharge Recommendations:  No therapy recommended at discharge   PT Equipment Recommendations  Equipment Needed: No  Other: Pt has walker , not used x 2 after hip replacemen t      Patient Diagnosis(es): The encounter diagnosis was TIA (transient ischemic attack). Past Medical History:  has a past medical history of Abnormal EKG, BPH (benign prostatic hyperplasia), Closed left hip fracture ([address]), Elevated blood-pressure reading without diagnosis of hypertension, Elevated PSA, Incomplete right bundle branch block, Psoriasis, Ventral hernia, and Vitamin D deficiency. Past Surgical History:  has a past surgical history that includes knee surgery (); Prostate biopsy (06/10/2016); Cardiac catheterization (); joint replacement (Left); and Finger trigger release (Right). Assessment   Assessment: Pt demonstrating baseline mobilty and strength report good family support, no DME needs and no furhter PT goals identified  Therapy Prognosis: Good  Decision Making: Low Complexity  No Skilled PT:  At baseline function  Requires PT Follow-Up: No  Activity Tolerance  Activity Tolerance: Patient tolerated evaluation without incident     Plan   Physcial Therapy Plan  General Plan: Discharge (1 visit)  Current Treatment Recommendations: Stair training, Gait training  Safety Devices  Type of Devices: Call light within reach, Nurse notified, Left in chair  Restraints  Restraints Initially in Place: No     Restrictions  Restrictions/Precautions  Restrictions/Precautions: Up as Tolerated  Required Braces or Orthoses?: No     Subjective   General  Chart Reviewed: Yes  Patient assessed for rehabilitation services?: Yes  Additional Pertinent Hx: 70-year-old male no past history of stroke, on baby aspirin no anticoagulation initially started having Orientation  Overall Orientation Status: Within Functional Limits  Orientation Level: Oriented X4  Cognition  Overall Cognitive Status: WFL     Objective   Pulse: 58  Heart Rate Source: Monitor  BP: (!) 146/97  BP Location: Right upper arm  BP Method: Automatic  MAP (Calculated): 113  Respirations: 13  SpO2: 99 %  O2 Device: None (Room air)     Observation/Palpation  Posture: Good  Gross Assessment  AROM: Within functional limits  PROM: Within functional limits  Strength:  Within functional limits  Coordination: Within functional limits  Tone: Normal  Sensation: Intact     AROM RLE (degrees)  RLE AROM: WFL  AROM LLE (degrees)  LLE AROM : WFL  AROM RUE (degrees)  RUE AROM : WFL  AROM LUE (degrees)  LUE AROM : WFL  Strength RLE  Strength RLE: WFL  Strength LLE  Strength LLE: WFL  Strength RUE  Strength RUE: WFL  Strength LUE  Strength LUE: WFL        Bed Mobility Training  Bed Mobility Training: No (pt up in chair at start and end of session)  Balance  Sitting: Intact  Standing: Intact (Pt demo ability to touch floor on R side in standing without LOB or difficulty)  Transfer Training  Transfer Training: Yes  Overall Level of Assistance: Independent  Interventions: Verbal cues  Sit to Stand: Independent  Stand to Sit: Independent  Gait  Overall Level of Assistance: Independent (to hallway without device or difficulty)  Bed mobility  Bridging: Independent  Rolling to Left: Independent  Rolling to Right: Independent  Supine to Sit: Independent  Sit to Supine: Independent  Scooting: Independent  Transfers  Sit to Stand: Supervision  Stand to Sit: Supervision  Bed to Chair: Supervision  Ambulation  Surface: Level tile  Device: No Device  Assistance: Stand by assistance  Gait Deviations: None  Distance: 250 ft without AD, SBA, pt report baseline mobility  Comments: Pt noted to have limp states baseline due to left NAKIA  Stairs/Curb  Stairs?: Yes  Stairs  # Steps : 9  Stairs Height: 4\"  Rails: Right ascending  Curbs:

## 2023-07-25 NOTE — CARE COORDINATION
Case Management Assessment  Initial Evaluation    Date/Time of Evaluation: 7/25/2023 12:55 PM  Assessment Completed by: Lise Corral RN    If patient is discharged prior to next notation, then this note serves as note for discharge by case management. Patient Name: Natanael Montiel                   YOB: 1960  Diagnosis: TIA (transient ischemic attack) [G45.9]  Transient ischemic attack [G45.9]                   Date / Time: 7/23/2023  4:18 PM    Patient Admission Status: Inpatient   Readmission Risk (Low < 19, Mod (19-27), High > 27): Readmission Risk Score: 3    Current PCP: Joe Abbott, DO  PCP verified by CM? Yes    Chart Reviewed: Yes      History Provided by: Patient  Patient Orientation: Alert and Oriented    Patient Cognition: Alert    Hospitalization in the last 30 days (Readmission):  No    If yes, Readmission Assessment in CM Navigator will be completed. Advance Directives:      Code Status: Full Code   Patient's Primary Decision Maker is: Legal Next of Kin      Discharge Planning:    Patient lives with: Spouse/Significant Other Type of Home: House  Primary Care Giver: Self  Patient Support Systems include: Spouse/Significant Other   Current Financial resources: Medicare  Current community resources: None  Current services prior to admission: None            Current DME:              Type of Home Care services:  None    ADLS  Prior functional level: Independent in ADLs/IADLs  Current functional level: Independent in ADLs/IADLs    PT AM-PAC:   /24  OT AM-PAC: 24 /24    Family can provide assistance at DC: Yes  Would you like Case Management to discuss the discharge plan with any other family members/significant others, and if so, who?     Plans to Return to Present Housing: Yes  Other Identified Issues/Barriers to RETURNING to current housing: none  Potential Assistance needed at discharge: N/A            Potential DME:    Patient expects to discharge to: [facility] for transportation at discharge: Family    Financial    Payor: Mar Nipper / Plan: Mar Nipper / Product Type: *No Product type* /     Does insurance require precert for SNF: Yes    Potential assistance Purchasing Medications: No  Meds-to-Beds request: Yes        Jaswant Brooks Minh, [address] [facility] [phone] Mari Pepper [phone] [address] [address]  Phone: [phone] Fax: [phone]      Notes:    Factors facilitating achievement of predicted outcomes: Motivated, Cooperative, and Pleasant    Barriers to discharge: none    Additional Case Management Notes: CM spoke with patient and his wife Pam Beltran to discuss transitional planning. Patient states that he will  be returning home with his wife at discharge. Patient states that he will have transportation home and he verbalizes having no additional transitional needs at this time. The Plan for Transition of Care is related to the following treatment goals of TIA (transient ischemic attack) [G45.9]  Transient ischemic attack [Q69.0]    IF APPLICABLE: The Patient and/or patient representative Laura Love and his family were provided with a choice of provider and agrees with the discharge plan. Freedom of choice list with basic dialogue that supports the patient's individualized plan of care/goals and shares the quality data associated with the providers was provided to: Patient   Patient Representative Name:       The Patient and/or Patient Representative Agree with the Discharge Plan?  Yes    Mark Boxer, RN  Case Management Department  Ph: [phone]

## 2023-07-25 NOTE — PROGRESS NOTES
Spouse  Type of Home: House  Home Layout: One level  Home Access: Stairs to enter with rails  Entrance Stairs - Number of Steps: 2  Entrance Stairs - Rails: Right  Bathroom Shower/Tub: Walk-in shower  Bathroom Toilet: Standard  Bathroom Equipment: Grab bars in shower  Home Equipment: Lawernce [place], Walker, rolling, Sock aid (uses sock aid daily since L hip replacement 4 years ago. Does not use DME to ambulate at baseline)  Has the patient had two or more falls in the past year or any fall with injury in the past year?: No  Receives Help From: Family  ADL Assistance: [address] Independent  Homemaking Responsibilities: Yes  Ambulation Assistance: Independent  Transfer Assistance: Independent  Active : No  Patient's  Info: wife drives  Occupation: Retired  Type of Occupation:   Additional Comments: Wife reports home with pt and able to assist ~90% of the time       Objective     Safety Devices  Type of Devices: Call light within reach;Nurse notified; Left in chair  Restraints  Restraints Initially in Place: No    Bed Mobility Training  Bed Mobility Training: No (pt up in chair at start and end of session)    Balance  Sitting: Intact  Standing: Intact (Pt demo ability to touch floor on R side in standing without LOB or difficulty)    Transfer Training  Transfer Training: Yes  Overall Level of Assistance: Independent  Interventions: Verbal cues  Sit to Stand: Independent  Stand to Sit: Independent    Gait  Overall Level of Assistance: Independent (to hallway without device or difficulty)     AROM: Within functional limits  Strength:  Within functional limits (5/5 BUE grossly)  Coordination: Within functional limits (pt is R handed)  Tone: Normal  Sensation: Intact    ADL  Feeding: Independent  Grooming: Independent  UE Bathing: Independent  LE Bathing: Independent  UE Dressing: Independent  LE Dressing: Modified independent   LE Dressing Skilled Clinical Factors: Pt reports uses sock aid to don L sock d/t hip replacement. Demo ability to perform on R side  Toileting: Independent              Vision  Vision: Impaired  Vision Exceptions: Wears glasses for reading;Cataracts  Hearing  Hearing: Exceptions to [facility]  Hearing Exceptions: Hard of hearing/hearing concerns;Bilateral hearing aid    Cognition  Overall Cognitive Status: WFL  Orientation  Overall Orientation Status: Within Functional Limits  Orientation Level: Oriented X4                  Education Given To: Patient; Family  Education Provided: Role of Therapy;Plan of Care  Education Provided Comments: role of therapy, plan of care, signs of stroke  Education Method: Verbal  Barriers to Learning: None  Education Outcome: Verbalized understanding    AM-PAC Score        AM-PAC Inpatient Daily Activity Raw Score: 24 (07/25/23 1013)  AM-PAC Inpatient ADL T-Scale Score : 57.54 (07/25/23 1013)  ADL Inpatient CMS 0-100% Score: 0 (07/25/23 1013)  ADL Inpatient CMS G-Code Modifier : CH (07/25/23 1013)      Therapy Time   Individual Concurrent Group Co-treatment   Time In 0949         Time Out 1003         Minutes 14         Timed Code Treatment Minutes: [address], OTR/L

## 2023-07-25 NOTE — PROGRESS NOTES
CLINICAL PHARMACY NOTE: MEDS TO BEDS    Total # of Prescriptions Filled: 3   The following medications were delivered to the patient:  Clopidogrel 75mg  Aspirin 81mg  Atorvastatin 80mg    Additional Documentation: delivered to patient in room 141 7/25 at 5:39pm. Co-pay $25.42 check.

## 2023-07-26 NOTE — TELEPHONE ENCOUNTER
PSA is 7.72, slightly lower than 1 year ago (8). Had negative MRI 1 year ago. Can fu in office with Dr. Georgia Barnes in 6 mos with PSA. Order placed.  Please arrange that fu

## 2023-08-02 ENCOUNTER — OFFICE VISIT (OUTPATIENT)
Dept: NEUROLOGY | Age: 63
End: 2023-08-02
Payer: COMMERCIAL

## 2023-08-02 VITALS
WEIGHT: 234 LBS | SYSTOLIC BLOOD PRESSURE: 171 MMHG | BODY MASS INDEX: 36.73 KG/M2 | HEIGHT: 67 IN | DIASTOLIC BLOOD PRESSURE: 97 MMHG | HEART RATE: 59 BPM

## 2023-08-02 DIAGNOSIS — I63.02 CEREBROVASCULAR ACCIDENT (CVA) DUE TO THROMBOSIS OF BASILAR ARTERY (HCC): Primary | ICD-10-CM

## 2023-08-02 PROCEDURE — 99215 OFFICE O/P EST HI 40 MIN: CPT | Performed by: PSYCHIATRY & NEUROLOGY

## 2023-08-02 RX ORDER — LOSARTAN POTASSIUM 50 MG/1
TABLET ORAL
COMMUNITY
Start: 2023-08-01
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
## 2023-08-04 ENCOUNTER — OFFICE VISIT (OUTPATIENT)
Dept: NEUROLOGY | Age: 63
End: 2023-08-04

## 2023-08-04 ENCOUNTER — HOSPITAL ENCOUNTER (OUTPATIENT)
Age: 63
Setting detail: SPECIMEN
Discharge: HOME | End: 2023-08-04

## 2023-08-04 VITALS
RESPIRATION RATE: 16 BRPM | SYSTOLIC BLOOD PRESSURE: 123 MMHG | HEART RATE: 63 BPM | DIASTOLIC BLOOD PRESSURE: 79 MMHG | WEIGHT: 234 LBS | OXYGEN SATURATION: 94 % | HEIGHT: 67 IN | BODY MASS INDEX: 36.73 KG/M2

## 2023-08-04 DIAGNOSIS — I63.02 CEREBROVASCULAR ACCIDENT (CVA) DUE TO THROMBOSIS OF BASILAR ARTERY (HCC): Primary | ICD-10-CM

## 2023-08-04 DIAGNOSIS — G45.9 TIA (TRANSIENT ISCHEMIC ATTACK): ICD-10-CM

## 2023-08-04 LAB
25(OH)D3 SERPL-MCNC: 42.7 NG/ML
ALBUMIN SERPL-MCNC: 3.8 G/DL (ref 3.5–5.2)
ALBUMIN/GLOB SERPL: 1.3 {RATIO} (ref 1–2.5)
ALP SERPL-CCNC: 88 U/L (ref 40–129)
ALT SERPL-CCNC: 14 U/L (ref 5–41)
ANION GAP SERPL CALCULATED.3IONS-SCNC: 47 MMOL/L (ref 9–17)
AST SERPL-CCNC: 13 U/L
BASOPHILS # BLD: 0.04 K/UL (ref 0–0.2)
BASOPHILS NFR BLD: 1 % (ref 0–2)
BILIRUB DIRECT SERPL-MCNC: 0.1 MG/DL
BILIRUB INDIRECT SERPL-MCNC: 0.3 MG/DL (ref 0–1)
BILIRUB SERPL-MCNC: 0.4 MG/DL (ref 0.3–1.2)
BUN SERPL-MCNC: 12 MG/DL (ref 8–23)
CALCIUM SERPL-MCNC: 9.2 MG/DL (ref 8.6–10.4)
CHLORIDE SERPL-SCNC: 76 MMOL/L (ref 98–107)
CO2 SERPL-SCNC: 27 MMOL/L (ref 20–31)
CREAT SERPL-MCNC: 0.8 MG/DL (ref 0.7–1.2)
EOSINOPHIL # BLD: 0.26 K/UL (ref 0–0.44)
EOSINOPHILS RELATIVE PERCENT: 4 % (ref 1–4)
ERYTHROCYTE [DISTWIDTH] IN BLOOD BY AUTOMATED COUNT: 13.2 % (ref 11.8–14.4)
ERYTHROCYTE [SEDIMENTATION RATE] IN BLOOD BY PHOTOMETRIC METHOD: 10 MM/HR (ref 0–20)
GFR SERPL CREATININE-BSD FRML MDRD: >60 ML/MIN/1.73M2
GLUCOSE SERPL-MCNC: 89 MG/DL (ref 70–99)
HCT VFR BLD AUTO: 45.5 % (ref 40.7–50.3)
HGB BLD-MCNC: 15.2 G/DL (ref 13–17)
IMM GRANULOCYTES # BLD AUTO: <0.03 K/UL (ref 0–0.3)
IMM GRANULOCYTES NFR BLD: 0 %
LYMPHOCYTES NFR BLD: 1.93 K/UL (ref 1.1–3.7)
LYMPHOCYTES RELATIVE PERCENT: 28 % (ref 24–43)
MCH RBC QN AUTO: 31.9 PG (ref 25.2–33.5)
MCHC RBC AUTO-ENTMCNC: 33.4 G/DL (ref 28.4–34.8)
MCV RBC AUTO: 95.4 FL (ref 82.6–102.9)
MONOCYTES NFR BLD: 0.68 K/UL (ref 0.1–1.2)
MONOCYTES NFR BLD: 10 % (ref 3–12)
NEUTROPHILS NFR BLD: 57 % (ref 36–65)
NEUTS SEG NFR BLD: 3.98 K/UL (ref 1.5–8.1)
NRBC BLD-RTO: 0 PER 100 WBC
PLATELET # BLD AUTO: 188 K/UL (ref 138–453)
PMV BLD AUTO: 12.2 FL (ref 8.1–13.5)
POTASSIUM SERPL-SCNC: 5 MMOL/L (ref 3.7–5.3)
PROT SERPL-MCNC: 6.7 G/DL (ref 6.4–8.3)
RBC # BLD AUTO: 4.77 M/UL (ref 4.21–5.77)
SODIUM SERPL-SCNC: 150 MMOL/L (ref 135–144)
TSH SERPL DL<=0.05 MIU/L-ACNC: 1.67 UIU/ML (ref 0.3–5)
WBC OTHER # BLD: 6.9 K/UL (ref 3.5–11.3)

## 2023-08-04 RX ORDER — ROSUVASTATIN CALCIUM 20 MG/1
20 TABLET, COATED ORAL DAILY
Qty: 30 TABLET | Refills: 11 | COMMUNITY
Start: 2023-08-03 | End: 2024-08-02

## 2023-08-04 NOTE — PROGRESS NOTES
Inattention:  0 - no abnormality    LABS:   No results for input(s): WBC, HGB, HCT, PLT, NA, K, CL, CO2, BUN, CREATININE, MG, PHOS, CALCIUM, PTT, INR, AST, ALT, BILITOT, BILIDIR, NITRU, COLORU, BACTERIA in the last 72 hours. Invalid input(s): PT, WBCU, RBCU, LEUKOCYTESUA  No results for input(s): ALKPHOS, ALT, AST, BILITOT, BILIDIR, LABALBU, AMYLASE, LIPASE in the last 72 hours. RADIOLOGY:         Brain MRI:      IMPRESSION:  No acute infarct. CTA H&N:   Proximal right common carotid artery is kinked/tortuous. Hypoplastic or  distal stenoses posterior cerebral arteries. Remote infarct right occipital  lobe. IMPRESSIONS:       Multiple events with similar semiology and negative MRI would make vascular insult less likely. T2 flair is suggestive of multiple embolic appearing old strokes vs UBOs. Agree with  obtaining GABRIELA and loop recorder to evaluate for any embolic/cardiac sources. I suggested doing DAPT for 3 weeks only followed by single antiplatelets agent preferably aspirin. My impression that this could be ESUS and drug of choice being baby aspirin (CHICO Trial). No intervention for the tortuous/kinked ICA. The patient prefers not to schedule a follow-up with our clinic. Discussed with Dr Tobi Corral.      Rani Quinn MD, MD  Pager [phone]  Stroke, [address] [facility]  [address] [place] [address]  Electronically signed 8/4/2023 at 12:04 PM

## 2023-08-21 ENCOUNTER — TELEPHONE (OUTPATIENT)
Dept: NEUROLOGY | Age: 63
End: 2023-08-21

## 2023-08-21 NOTE — TELEPHONE ENCOUNTER
Patient's wife, Loretta Santana, called into the office. Patient saw you on 8/2/2023, and according to your last OV note you recommended that the patient start Aspirin and Plavix 75mg for a total of 3 weeks. On 8/4/2023, the patient saw Dr. Bruno Medina MD and he recommended that the patient stop taking plavix and continue taking aspirin. The patient's PCP ordered Vascular Duplex Carotid Bilateral and an Echo--he is set to have these done on 8/25/2023. Please advise.

## 2023-08-25 ENCOUNTER — HOSPITAL ENCOUNTER (OUTPATIENT)
Dept: VASCULAR LAB | Age: 63
End: 2023-08-25
Attending: FAMILY MEDICINE
Payer: COMMERCIAL

## 2023-08-25 ENCOUNTER — HOSPITAL ENCOUNTER (OUTPATIENT)
Age: 63
End: 2023-08-25
Attending: FAMILY MEDICINE
Payer: COMMERCIAL

## 2023-08-25 VITALS
BODY MASS INDEX: 36.73 KG/M2 | HEIGHT: 67 IN | SYSTOLIC BLOOD PRESSURE: 123 MMHG | WEIGHT: 234 LBS | HEART RATE: 63 BPM | DIASTOLIC BLOOD PRESSURE: 79 MMHG

## 2023-08-25 DIAGNOSIS — E78.5 HYPERLIPIDEMIA, UNSPECIFIED HYPERLIPIDEMIA TYPE: ICD-10-CM

## 2023-08-25 DIAGNOSIS — R09.89 BILATERAL CAROTID BRUITS: ICD-10-CM

## 2023-08-25 DIAGNOSIS — R06.09 DYSPNEA ON EXERTION: ICD-10-CM

## 2023-08-25 LAB
ECHO AV AREA PEAK VELOCITY: 4.3 CM2
ECHO AV AREA VTI: 4.1 CM2
ECHO AV AREA/BSA PEAK VELOCITY: 2 CM2/M2
ECHO AV AREA/BSA VTI: 1.9 CM2/M2
ECHO AV MEAN GRADIENT: 5 MMHG
ECHO AV MEAN VELOCITY: 1 M/S
ECHO AV PEAK GRADIENT: 9 MMHG
ECHO AV PEAK VELOCITY: 1.5 M/S
ECHO AV VELOCITY RATIO: 1.07
ECHO AV VTI: 34 CM
ECHO BSA: 2.24 M2
ECHO EST RA PRESSURE: 3 MMHG
ECHO LA AREA 2C: 19.8 CM2
ECHO LA AREA 4C: 13.7 CM2
ECHO LA MAJOR AXIS: 5.4 CM
ECHO LA MINOR AXIS: 6.1 CM
ECHO LA VOL 2C: 53 ML (ref 18–58)
ECHO LA VOL 4C: 28 ML (ref 18–58)
ECHO LA VOL BP: 41 ML (ref 18–58)
ECHO LA VOL/BSA BIPLANE: 19 ML/M2 (ref 16–34)
ECHO LA VOLUME INDEX A2C: 25 ML/M2 (ref 16–34)
ECHO LA VOLUME INDEX A4C: 13 ML/M2 (ref 16–34)
ECHO LV E' LATERAL VELOCITY: 9 CM/S
ECHO LV E' SEPTAL VELOCITY: 8 CM/S
ECHO LV FRACTIONAL SHORTENING: 34 % (ref 28–44)
ECHO LV INTERNAL DIMENSION DIASTOLE INDEX: 2.18 CM/M2
ECHO LV INTERNAL DIMENSION DIASTOLIC: 4.7 CM (ref 4.2–5.9)
ECHO LV INTERNAL DIMENSION SYSTOLIC INDEX: 1.44 CM/M2
ECHO LV INTERNAL DIMENSION SYSTOLIC: 3.1 CM
ECHO LV IVSD: 1.5 CM (ref 0.6–1)
ECHO LV MASS 2D: 294.1 G (ref 88–224)
ECHO LV MASS INDEX 2D: 136.1 G/M2 (ref 49–115)
ECHO LV POSTERIOR WALL DIASTOLIC: 1.5 CM (ref 0.6–1)
ECHO LV RELATIVE WALL THICKNESS RATIO: 0.64
ECHO LVOT AREA: 4.2 CM2
ECHO LVOT AV VTI INDEX: 0.99
ECHO LVOT DIAM: 2.3 CM
ECHO LVOT MEAN GRADIENT: 6 MMHG
ECHO LVOT PEAK GRADIENT: 10 MMHG
ECHO LVOT PEAK VELOCITY: 1.6 M/S
ECHO LVOT STROKE VOLUME INDEX: 64.8 ML/M2
ECHO LVOT SV: 139.9 ML
ECHO LVOT VTI: 33.7 CM
ECHO MV A VELOCITY: 0.76 M/S
ECHO MV AREA VTI: 5.2 CM2
ECHO MV E DECELERATION TIME (DT): 215 MS
ECHO MV E VELOCITY: 0.67 M/S
ECHO MV E/A RATIO: 0.88
ECHO MV E/E' LATERAL: 7.44
ECHO MV E/E' RATIO (AVERAGED): 7.91
ECHO MV E/E' SEPTAL: 8.38
ECHO MV LVOT VTI INDEX: 0.8
ECHO MV MAX VELOCITY: 0.9 M/S
ECHO MV MEAN GRADIENT: 1 MMHG
ECHO MV MEAN VELOCITY: 0.5 M/S
ECHO MV PEAK GRADIENT: 3 MMHG
ECHO MV VTI: 27.1 CM
ECHO RA AREA 4C: 13.5 CM2
ECHO RA END SYSTOLIC VOLUME APICAL 4 CHAMBER INDEX BSA: 13 ML/M2
ECHO RA VOLUME: 28 ML
ECHO RIGHT VENTRICULAR SYSTOLIC PRESSURE (RVSP): 26 MMHG
ECHO RV TAPSE: 2.2 CM (ref 1.7–?)
ECHO TV REGURGITANT MAX VELOCITY: 2.4 M/S

## 2023-08-25 PROCEDURE — 6360000004 HC RX CONTRAST MEDICATION: Performed by: INTERNAL MEDICINE

## 2023-08-25 PROCEDURE — C8929 TTE W OR WO FOL WCON,DOPPLER: HCPCS

## 2023-08-25 PROCEDURE — 93880 EXTRACRANIAL BILAT STUDY: CPT

## 2023-08-25 PROCEDURE — 93880 EXTRACRANIAL BILAT STUDY: CPT | Performed by: STUDENT IN AN ORGANIZED HEALTH CARE EDUCATION/TRAINING PROGRAM

## 2023-08-25 RX ADMIN — PERFLUTREN 2 ML: 6.52 INJECTION, SUSPENSION INTRAVENOUS at 10:40

## 2023-08-26 LAB
VAS LEFT BULB EDV: 20.1 CM/S
VAS LEFT BULB PSV: 70.3 CM/S
VAS LEFT CCA DIST EDV: 23.6 CM/S
VAS LEFT CCA DIST PSV: 82.1 CM/S
VAS LEFT CCA MID EDV: 28 CM/S
VAS LEFT CCA MID PSV: 86 CM/S
VAS LEFT CCA PROX EDV: 26.5 CM/S
VAS LEFT CCA PROX PSV: 90.9 CM/S
VAS LEFT ECA EDV: 20.9 CM/S
VAS LEFT ECA PSV: 86.2 CM/S
VAS LEFT ICA DIST EDV: 30 CM/S
VAS LEFT ICA DIST PSV: 61.9 CM/S
VAS LEFT ICA MID EDV: 26.5 CM/S
VAS LEFT ICA MID PSV: 62.4 CM/S
VAS LEFT ICA PROX EDV: 23.1 CM/S
VAS LEFT ICA PROX PSV: 59.5 CM/S
VAS LEFT ICA/CCA PSV: 0.72
VAS LEFT VERTEBRAL EDV: 11.6 CM/S
VAS LEFT VERTEBRAL PSV: 38.9 CM/S
VAS RIGHT BULB EDV: 18.4 CM/S
VAS RIGHT BULB PSV: 71.1 CM/S
VAS RIGHT CCA DIST EDV: 22 CM/S
VAS RIGHT CCA DIST PSV: 83.4 CM/S
VAS RIGHT CCA MID EDV: 26.7 CM/S
VAS RIGHT CCA MID PSV: 92.6 CM/S
VAS RIGHT CCA PROX EDV: 24.9 CM/S
VAS RIGHT CCA PROX PSV: 93.8 CM/S
VAS RIGHT ECA EDV: 19.3 CM/S
VAS RIGHT ECA PSV: 87 CM/S
VAS RIGHT ICA DIST EDV: 21.6 CM/S
VAS RIGHT ICA DIST PSV: 60 CM/S
VAS RIGHT ICA MID EDV: 27.5 CM/S
VAS RIGHT ICA MID PSV: 71.8 CM/S
VAS RIGHT ICA PROX EDV: 26.1 CM/S
VAS RIGHT ICA PROX PSV: 75.8 CM/S
VAS RIGHT ICA/CCA PSV: 0.91
VAS RIGHT VERTEBRAL EDV: 7.71 CM/S
VAS RIGHT VERTEBRAL PSV: 26.6 CM/S

## 2023-08-31 ENCOUNTER — HOSPITAL ENCOUNTER (OUTPATIENT)
Age: 63
Setting detail: SPECIMEN
Discharge: HOME | End: 2023-08-31

## 2023-08-31 LAB
ANION GAP SERPL CALCULATED.3IONS-SCNC: 9 MMOL/L (ref 9–17)
BUN SERPL-MCNC: 13 MG/DL (ref 8–23)
CALCIUM SERPL-MCNC: 9.4 MG/DL (ref 8.6–10.4)
CHLORIDE SERPL-SCNC: 102 MMOL/L (ref 98–107)
CO2 SERPL-SCNC: 27 MMOL/L (ref 20–31)
CREAT SERPL-MCNC: 0.7 MG/DL (ref 0.7–1.2)
GFR SERPL CREATININE-BSD FRML MDRD: >60 ML/MIN/1.73M2
GLUCOSE SERPL-MCNC: 77 MG/DL (ref 70–99)
POTASSIUM SERPL-SCNC: 5.3 MMOL/L (ref 3.7–5.3)
SODIUM SERPL-SCNC: 138 MMOL/L (ref 135–144)

## 2023-09-11 ENCOUNTER — TELEPHONE (OUTPATIENT)
Dept: NEUROLOGY | Age: 63
End: 2023-09-11

## 2023-09-11 DIAGNOSIS — I63.02 CEREBROVASCULAR ACCIDENT (CVA) DUE TO THROMBOSIS OF BASILAR ARTERY (HCC): Primary | ICD-10-CM

## 2023-09-11 DIAGNOSIS — G45.9 TIA (TRANSIENT ISCHEMIC ATTACK): ICD-10-CM

## 2023-09-11 NOTE — TELEPHONE ENCOUNTER
Patient's wife Aman Zayas ([phone] or [phone]) called the office this morning. She stated that Dr. Ramiro Esteban had previously wanted to refer Anamika Reyna to cardiology for evaluation and GABRIELA. However at that time they had other things going on and wanted to wait. Wife stated that they would like to proceed with the referral.  Writer advised that I would forward this message to Dr. Ramiro Esteban.

## 2023-09-14 NOTE — TELEPHONE ENCOUNTER
Mrs. Adair Garcia called the office this morning and this information was given. Wife was agreeable with going to [facility]. Writer advised that the referral / orders would be sent to [facility] and they would call to schedule. Writer asked that they call if there were any problems. Referral and 8/2/23 office note successfully faxed to [facility] at [phone].

## 2023-12-15 ENCOUNTER — HOSPITAL ENCOUNTER (OUTPATIENT)
Age: 63
Setting detail: OUTPATIENT SURGERY
Discharge: HOME | End: 2023-12-15
Attending: INTERNAL MEDICINE | Admitting: INTERNAL MEDICINE
Payer: COMMERCIAL

## 2023-12-15 ENCOUNTER — APPOINTMENT (OUTPATIENT)
Age: 63
End: 2023-12-15
Attending: INTERNAL MEDICINE
Payer: COMMERCIAL

## 2023-12-15 VITALS
TEMPERATURE: 97.3 F | RESPIRATION RATE: 14 BRPM | BODY MASS INDEX: 46.53 KG/M2 | OXYGEN SATURATION: 97 % | HEART RATE: 70 BPM | SYSTOLIC BLOOD PRESSURE: 114 MMHG | WEIGHT: 236.99 LBS | HEIGHT: 60 IN | DIASTOLIC BLOOD PRESSURE: 57 MMHG

## 2023-12-15 DIAGNOSIS — G45.9 TIA (TRANSIENT ISCHEMIC ATTACK): Primary | ICD-10-CM

## 2023-12-15 DIAGNOSIS — G45.9 TRANSIENT ISCHEMIC ATTACK: ICD-10-CM

## 2023-12-15 DIAGNOSIS — I63.9 CEREBROVASCULAR ACCIDENT (CVA), UNSPECIFIED MECHANISM (HCC): ICD-10-CM

## 2023-12-15 LAB
BUN BLD-MCNC: 14 MG/DL (ref 8–26)
CHLORIDE BLD-SCNC: 103 MMOL/L (ref 98–107)
ECHO BSA: 1.75 M2
EGFR, POC: >60 ML/MIN/1.73M2
GLUCOSE BLD-MCNC: 97 MG/DL (ref 74–100)
HCT VFR BLD AUTO: 46 % (ref 41–53)
POC CREATININE: 0.7 MG/DL (ref 0.51–1.19)
POC HEMOGLOBIN (CALC): 15.7 G/DL (ref 13.5–17.5)
POTASSIUM BLD-SCNC: 4 MMOL/L (ref 3.5–4.5)
SODIUM BLD-SCNC: 141 MMOL/L (ref 138–146)

## 2023-12-15 PROCEDURE — C1892 INTRO/SHEATH,FIXED,PEEL-AWAY: HCPCS | Performed by: INTERNAL MEDICINE

## 2023-12-15 PROCEDURE — 2709999900 HC NON-CHARGEABLE SUPPLY: Performed by: INTERNAL MEDICINE

## 2023-12-15 PROCEDURE — 33285 INSJ SUBQ CAR RHYTHM MNTR: CPT | Performed by: INTERNAL MEDICINE

## 2023-12-15 PROCEDURE — 93325 DOPPLER ECHO COLOR FLOW MAPG: CPT | Performed by: INTERNAL MEDICINE

## 2023-12-15 PROCEDURE — 84520 ASSAY OF UREA NITROGEN: CPT

## 2023-12-15 PROCEDURE — 93312 ECHO TRANSESOPHAGEAL: CPT | Performed by: INTERNAL MEDICINE

## 2023-12-15 PROCEDURE — 82565 ASSAY OF CREATININE: CPT

## 2023-12-15 PROCEDURE — 84132 ASSAY OF SERUM POTASSIUM: CPT

## 2023-12-15 PROCEDURE — 6370000000 HC RX 637 (ALT 250 FOR IP): Performed by: INTERNAL MEDICINE

## 2023-12-15 PROCEDURE — 6360000002 HC RX W HCPCS: Performed by: INTERNAL MEDICINE

## 2023-12-15 PROCEDURE — 99152 MOD SED SAME PHYS/QHP 5/>YRS: CPT | Performed by: INTERNAL MEDICINE

## 2023-12-15 PROCEDURE — 85014 HEMATOCRIT: CPT

## 2023-12-15 PROCEDURE — 82435 ASSAY OF BLOOD CHLORIDE: CPT

## 2023-12-15 PROCEDURE — 7100000011 HC PHASE II RECOVERY - ADDTL 15 MIN: Performed by: INTERNAL MEDICINE

## 2023-12-15 PROCEDURE — 84295 ASSAY OF SERUM SODIUM: CPT

## 2023-12-15 PROCEDURE — 7100000010 HC PHASE II RECOVERY - FIRST 15 MIN: Performed by: INTERNAL MEDICINE

## 2023-12-15 PROCEDURE — 99221 1ST HOSP IP/OBS SF/LOW 40: CPT | Performed by: INTERNAL MEDICINE

## 2023-12-15 PROCEDURE — 82947 ASSAY GLUCOSE BLOOD QUANT: CPT

## 2023-12-15 PROCEDURE — 93312 ECHO TRANSESOPHAGEAL: CPT

## 2023-12-15 RX ORDER — MIDAZOLAM HYDROCHLORIDE 1 MG/ML
INJECTION INTRAMUSCULAR; INTRAVENOUS PRN
Status: DISCONTINUED | OUTPATIENT
Start: 2023-12-15 | End: 2023-12-15 | Stop reason: HOSPADM

## 2023-12-15 RX ORDER — SODIUM CHLORIDE 9 MG/ML
INJECTION, SOLUTION INTRAVENOUS CONTINUOUS
Status: DISCONTINUED | OUTPATIENT
Start: 2023-12-15 | End: 2023-12-15 | Stop reason: HOSPADM

## 2023-12-15 RX ORDER — FENTANYL CITRATE 50 UG/ML
INJECTION, SOLUTION INTRAMUSCULAR; INTRAVENOUS PRN
Status: DISCONTINUED | OUTPATIENT
Start: 2023-12-15 | End: 2023-12-15 | Stop reason: HOSPADM

## 2023-12-15 RX ORDER — LIDOCAINE HYDROCHLORIDE 20 MG/ML
JELLY TOPICAL PRN
Status: DISCONTINUED | OUTPATIENT
Start: 2023-12-15 | End: 2023-12-15 | Stop reason: HOSPADM

## 2023-12-15 NOTE — PROGRESS NOTES
Pt ambulated to restroom without difficulty. Gag reflex checked and returned. All discharge instructions reviewed with patient, all questions answered, no concerns at this time. IV removed.

## 2023-12-15 NOTE — H&P
[facility] Cardiology Cardiology    Consult / H&P               Today's Date: 12/15/2023  Patient Name: Michelle Rodriguez  Date of admission: 12/15/2023  9:39 AM  Patient's age: 61 y. o., 1960  Admission Dx: Transient ischemic attack [G45.9]    Requesting Physician: Kathy Joseph MD    Cardiac Evaluation Reason:  GABRIELA/Loop    History Obtained From: patient and chart review     History of Present Illness: This patient 61y.o. years old with past medical history given below. who was seen by  for TIA. He was scheduled for GABRIELA/Loop. Pt report no Sx and ready for the procedure     Past Medical History:   has a past medical history of Abnormal EKG, BPH (benign prostatic hyperplasia), Closed left hip fracture ([address]), Elevated blood-pressure reading without diagnosis of hypertension, Elevated PSA, Incomplete right bundle branch block, Psoriasis, Ventral hernia, and Vitamin D deficiency. Past Surgical History:   has a past surgical history that includes knee surgery (2001); Prostate biopsy (06/10/2016); Cardiac catheterization (2004); joint replacement (Left); and Finger trigger release (Right). Home Medications:    Prior to Admission medications    Medication Sig Start Date End Date Taking? Authorizing Provider   losartan-hydroCHLOROthiazide (HYZAAR) 100-25 MG per tablet Take 1 tablet by mouth daily 9/26/23   Waldemar Hernadez MD   rosuvastatin (CRESTOR) 20 MG tablet Take 1 tablet by mouth daily 8/3/23 8/2/24  Waldemar Hernadez MD   Cholecalciferol (VITAMIN D3) 125 MCG (5000 UT) TABS Take by mouth    Waldemar Hernadez MD   aspirin 81 MG EC tablet Take 1 tablet by mouth daily for 20 days 7/26/23 11/2/23  Renetta Bonner MD   donepezil (ARICEPT) 5 MG tablet 1 tablet at bedtime    Waldemar Hernadez MD       Allergies:  Patient has no known allergies. Social History:   reports that he has been smoking cigarettes. He started smoking about 48 years ago.  He has never used to follow post procedure. Noah Davis MD.  Fellow, cardiovascular disease   [place], [place]        Attending Physician Statement:    I have discussed the care of  Juan Carlos Diaz , including pertinent history and exam findings, with the Cardiology fellow/resident. I have seen and examined the patient and the key elements of all parts of the encounter have been performed by me. I agree with the assessment, plan and orders as documented by the fellow/resident.

## 2023-12-15 NOTE — PROGRESS NOTES
Pt received post GABRIELA/ILR procedure to [facility] 4. VSS, pt AOX4, all questions answered. Pt and spouse aware of NPO restrictions for one hour post procedure. Dr. Ashley Zamudio out to talk to pt and spouse.

## 2023-12-15 NOTE — PROGRESS NOTES
Patient admitted, consent signed and questions answered. Patient ready for procedure. Call light to reach with side rails up 2 of 2. Chest clipped with writer and Bib Gaming RN present. Family at bedside with patient. History and physical completed.

## 2023-12-16 LAB — ECHO BSA: 1.75 M2

## 2024-01-04 ENCOUNTER — OFFICE VISIT (OUTPATIENT)
Dept: ORTHOPEDIC SURGERY | Age: 64
End: 2024-01-04

## 2024-01-04 DIAGNOSIS — M25.562 LEFT KNEE PAIN, UNSPECIFIED CHRONICITY: Primary | ICD-10-CM

## 2024-01-04 RX ORDER — LIDOCAINE HYDROCHLORIDE 10 MG/ML
2 INJECTION, SOLUTION INFILTRATION; PERINEURAL ONCE
Status: COMPLETED | OUTPATIENT
Start: 2024-01-04 | End: 2024-01-04

## 2024-01-04 RX ORDER — BUPIVACAINE HYDROCHLORIDE 5 MG/ML
2 INJECTION, SOLUTION PERINEURAL ONCE
Status: COMPLETED | OUTPATIENT
Start: 2024-01-04 | End: 2024-01-04

## 2024-01-04 RX ORDER — BETAMETHASONE SODIUM PHOSPHATE AND BETAMETHASONE ACETATE 3; 3 MG/ML; MG/ML
12 INJECTION, SUSPENSION INTRA-ARTICULAR; INTRALESIONAL; INTRAMUSCULAR; SOFT TISSUE ONCE
Status: COMPLETED | OUTPATIENT
Start: 2024-01-04 | End: 2024-01-04

## 2024-01-04 RX ADMIN — BETAMETHASONE SODIUM PHOSPHATE AND BETAMETHASONE ACETATE 12 MG: 3; 3 INJECTION, SUSPENSION INTRA-ARTICULAR; INTRALESIONAL; INTRAMUSCULAR; SOFT TISSUE at 14:36

## 2024-01-04 RX ADMIN — BUPIVACAINE HYDROCHLORIDE 10 MG: 5 INJECTION, SOLUTION PERINEURAL at 14:36

## 2024-01-04 RX ADMIN — LIDOCAINE HYDROCHLORIDE 2 ML: 10 INJECTION, SOLUTION INFILTRATION; PERINEURAL at 14:37

## 2024-01-04 SDOH — HEALTH STABILITY: PHYSICAL HEALTH: ON AVERAGE, HOW MANY DAYS PER WEEK DO YOU ENGAGE IN MODERATE TO STRENUOUS EXERCISE (LIKE A BRISK WALK)?: 0 DAYS

## 2024-01-04 NOTE — PROGRESS NOTES
Vital Sign     Days of Exercise per Week: 0 days     Minutes of Exercise per Session: Not on file   Stress: Not on file   Social Connections: Not on file   Intimate Partner Violence: Not on file   Housing Stability: Not on file     Past Medical History:   Diagnosis Date    Abnormal EKG 2004    BPH (benign prostatic hyperplasia)     Closed left hip fracture (HCC)     Hx of fall down basement steps    Elevated blood-pressure reading without diagnosis of hypertension     Elevated PSA     History of loop recorder 12/15/2023    insertion 12/15/2023    Incomplete right bundle branch block     Per EKG 4-14-21    Psoriasis     Ventral hernia     Vitamin D deficiency      Past Surgical History:   Procedure Laterality Date    CARDIAC CATHETERIZATION  2004    for abnormal EKG, No clots    CARDIAC PROCEDURE N/A 12/15/2023    Tito during cath case performed by Selena Andre MD at [facility] CARDIAC CATH LAB    EP DEVICE PROCEDURE N/A 12/15/2023    jesse / tito/Loop recorder insert performed by Selena Andre MD at [facility] CARDIAC CATH LAB    FINGER TRIGGER RELEASE Right     JOINT REPLACEMENT Left     hip    KNEE SURGERY  2001    R/t torn meniscus- had arthroscopy done- not sure what knee    PROSTATE BIOPSY  06/10/2016    with ultrasound- has had multiple bx's of prostate    TRANSESOPHAGEAL ECHOCARDIOGRAM  12/15/2023     Family History   Problem Relation Age of Onset    Heart Disease Mother     Prostate Cancer Father     High Blood Pressure Father     Diabetes Father     Heart Disease Father     Stroke Paternal Grandfather    Plan  An informed verbal consent for the procedure was obtained and risks including, but not limited to: allergy to medications, injection, bleeding, stiffness of joint, recurrence of symptoms, loss of function, swelling, drainage, irrigation, need for surgery and pseudo-septic inflammation, were explained to the patient. Also, discussed was the potential for further injections, irrigation and debridement

## 2024-01-16 ENCOUNTER — HOSPITAL ENCOUNTER (OUTPATIENT)
Age: 64
Setting detail: SPECIMEN
Discharge: HOME | End: 2024-01-16

## 2024-01-16 DIAGNOSIS — R97.20 ELEVATED PSA: ICD-10-CM

## 2024-01-17 LAB — PSA SERPL-MCNC: 10.01 NG/ML

## 2024-01-23 ENCOUNTER — OFFICE VISIT (OUTPATIENT)
Dept: UROLOGY | Age: 64
End: 2024-01-23
Payer: COMMERCIAL

## 2024-01-23 VITALS
HEIGHT: 67 IN | HEART RATE: 80 BPM | TEMPERATURE: 96.9 F | SYSTOLIC BLOOD PRESSURE: 122 MMHG | DIASTOLIC BLOOD PRESSURE: 74 MMHG | WEIGHT: 236 LBS | BODY MASS INDEX: 37.04 KG/M2

## 2024-01-23 DIAGNOSIS — N40.0 BPH WITHOUT OBSTRUCTION/LOWER URINARY TRACT SYMPTOMS: ICD-10-CM

## 2024-01-23 DIAGNOSIS — Z80.42 FAMILY HISTORY OF PROSTATE CANCER IN FATHER: ICD-10-CM

## 2024-01-23 DIAGNOSIS — R97.20 ELEVATED PSA: Primary | ICD-10-CM

## 2024-01-23 PROCEDURE — 99213 OFFICE O/P EST LOW 20 MIN: CPT | Performed by: UROLOGY

## 2024-01-23 RX ORDER — DIAZEPAM 10 MG/1
10 TABLET ORAL ONCE
Qty: 1 TABLET | Refills: 0 | Status: SHIPPED | OUTPATIENT
Start: 2024-01-23 | End: 2024-01-23

## 2024-01-23 ASSESSMENT — ENCOUNTER SYMPTOMS
EYE PAIN: 0
WHEEZING: 0
BACK PAIN: 0
COUGH: 0
DIARRHEA: 0
ABDOMINAL PAIN: 0
RESPIRATORY NEGATIVE: 1
EYE REDNESS: 0
SHORTNESS OF BREATH: 0
CONSTIPATION: 0
VOMITING: 0
GASTROINTESTINAL NEGATIVE: 1
NAUSEA: 0

## 2024-01-23 NOTE — PROGRESS NOTES
Review of Systems   Constitutional: Negative.  Negative for appetite change, chills and fatigue.   Eyes: Negative.  Negative for pain, redness and visual disturbance.   Respiratory: Negative.  Negative for cough, shortness of breath and wheezing.    Cardiovascular: Negative.  Negative for chest pain and leg swelling.   Gastrointestinal: Negative.  Negative for abdominal pain, constipation, diarrhea, nausea and vomiting.   Genitourinary: Negative.  Negative for difficulty urinating, dysuria, flank pain, frequency, hematuria and urgency.   Musculoskeletal: Negative.  Negative for back pain, joint swelling and myalgias.   Skin: Negative.  Negative for rash and wound.   Neurological: Negative.  Negative for dizziness, weakness and numbness.   Hematological: Negative.  Does not bruise/bleed easily.     
PROCEDURE N/A 12/15/2023    Tito during cath case performed by Selena Andre MD at [facility] CARDIAC CATH LAB    EP DEVICE PROCEDURE N/A 12/15/2023    andre / tito/Loop recorder insert performed by Selena Andre MD at [facility] CARDIAC CATH LAB    FINGER TRIGGER RELEASE Right     JOINT REPLACEMENT Left     hip    KNEE SURGERY  2001    R/t torn meniscus- had arthroscopy done- not sure what knee    PROSTATE BIOPSY  06/10/2016    with ultrasound- has had multiple bx's of prostate    TRANSESOPHAGEAL ECHOCARDIOGRAM  12/15/2023     Family History   Problem Relation Age of Onset    Heart Disease Mother     Prostate Cancer Father     High Blood Pressure Father     Diabetes Father     Heart Disease Father     Stroke Paternal Grandfather      Outpatient Medications Marked as Taking for the 1/23/24 encounter (Office Visit) with Johnathon Mayorga MD   Medication Sig Dispense Refill    losartan-hydroCHLOROthiazide (HYZAAR) 100-25 MG per tablet Take 1 tablet by mouth daily      rosuvastatin (CRESTOR) 20 MG tablet Take 1 tablet by mouth daily 30 tablet 11    Cholecalciferol (VITAMIN D3) 125 MCG (5000 UT) TABS Take by mouth         Patient has no known allergies.  Social History     Tobacco Use   Smoking Status Every Day    Types: Cigarettes    Start date: 5/27/1975   Smokeless Tobacco Never     (Ifpatient a smoker, smoking cessation counseling offered)    Social History     Substance and Sexual Activity   Alcohol Use No    Alcohol/week: 0.0 standard drinks of alcohol       REVIEW OF SYSTEMS:  Review of Systems    Physical Exam:      Vitals:    01/23/24 1443   BP: 122/74   Pulse: 80   Temp: 96.9 °F (36.1 °C)     Body mass index is 36.96 kg/m².  Patient is a 63 y.o. male in no acute distress and alert and oriented to person, place and time.  Physical Exam  Constitutional: Patient in no acute distress.  Neuro: Alert and oriented to person, place and time.  Psych: Mood normal, affect normal  Skin: No rash noted    Assessment and

## 2024-02-20 PROBLEM — Z45.09 ENCOUNTER FOR LOOP RECORDER CHECK: Status: ACTIVE | Noted: 2024-02-20

## 2024-03-06 ENCOUNTER — TELEPHONE (OUTPATIENT)
Dept: ONCOLOGY | Age: 64
End: 2024-03-06

## 2024-03-06 VITALS — WEIGHT: 231 LBS | HEIGHT: 67 IN | BODY MASS INDEX: 36.26 KG/M2

## 2024-03-12 ENCOUNTER — OFFICE VISIT (OUTPATIENT)
Dept: UROLOGY | Age: 64
End: 2024-03-12
Payer: COMMERCIAL

## 2024-03-12 VITALS
SYSTOLIC BLOOD PRESSURE: 124 MMHG | DIASTOLIC BLOOD PRESSURE: 78 MMHG | HEIGHT: 67 IN | TEMPERATURE: 97.8 F | BODY MASS INDEX: 36.26 KG/M2 | OXYGEN SATURATION: 95 % | WEIGHT: 231 LBS | HEART RATE: 70 BPM

## 2024-03-12 DIAGNOSIS — N40.0 BPH WITHOUT OBSTRUCTION/LOWER URINARY TRACT SYMPTOMS: ICD-10-CM

## 2024-03-12 DIAGNOSIS — R97.20 ELEVATED PSA: Primary | ICD-10-CM

## 2024-03-12 PROCEDURE — 99214 OFFICE O/P EST MOD 30 MIN: CPT | Performed by: UROLOGY

## 2024-03-12 RX ORDER — DIAZEPAM 10 MG/1
TABLET ORAL
COMMUNITY
Start: 2024-02-01

## 2024-03-12 RX ORDER — ALBUTEROL SULFATE 90 UG/1
2 AEROSOL, METERED RESPIRATORY (INHALATION) EVERY 6 HOURS PRN
COMMUNITY

## 2024-03-12 ASSESSMENT — ENCOUNTER SYMPTOMS
WHEEZING: 0
ALLERGIC/IMMUNOLOGIC NEGATIVE: 1
EYE PAIN: 0
COUGH: 0
VOMITING: 0
EYE REDNESS: 0
NAUSEA: 0
ABDOMINAL PAIN: 0
COLOR CHANGE: 0
SHORTNESS OF BREATH: 0
EYES NEGATIVE: 1
BACK PAIN: 0
RESPIRATORY NEGATIVE: 1
GASTROINTESTINAL NEGATIVE: 1

## 2024-03-12 NOTE — PROGRESS NOTES
Review of Systems   Constitutional: Negative.  Negative for appetite change, chills and fever.   HENT: Negative.     Eyes: Negative.  Negative for pain, redness and visual disturbance.   Respiratory: Negative.  Negative for cough, shortness of breath and wheezing.    Cardiovascular: Negative.  Negative for chest pain and leg swelling.   Gastrointestinal: Negative.  Negative for abdominal pain, nausea and vomiting.   Endocrine: Negative.    Genitourinary: Negative.  Negative for difficulty urinating, dysuria, flank pain, frequency, hematuria, testicular pain and urgency.   Musculoskeletal: Negative.  Negative for back pain, joint swelling and myalgias.   Skin: Negative.  Negative for color change, rash and wound.   Allergic/Immunologic: Negative.    Neurological: Negative.  Negative for dizziness, tremors, weakness, numbness and headaches.   Hematological: Negative.  Negative for adenopathy. Does not bruise/bleed easily.   Psychiatric/Behavioral: Negative.       
rash noted    Assessment and Plan      1. Elevated PSA    2. BPH without obstruction/lower urinary tract symptoms           Plan:         Doing well.   Has a weak stream.   We discussed flomax, but he wants to hold off for now.   MRI is negative.   F/U in July with a PSA.     Return in about 4 months (around 7/12/2024).    Prescriptions Ordered:  No orders of the defined types were placed in this encounter.    Orders Placed:  Orders Placed This Encounter   Procedures    PSA, Diagnostic     Standing Status:   Future     Standing Expiration Date:   3/12/2025           Johnathon Mayorga MD    Agree with the ROS entered by the MA.

## 2024-03-13 ENCOUNTER — HOSPITAL ENCOUNTER (OUTPATIENT)
Dept: PULMONOLOGY | Age: 64
Discharge: HOME | End: 2024-03-13
Attending: FAMILY MEDICINE
Payer: COMMERCIAL

## 2024-03-13 ENCOUNTER — HOSPITAL ENCOUNTER (OUTPATIENT)
Dept: CT IMAGING | Age: 64
Discharge: HOME | End: 2024-03-15
Attending: FAMILY MEDICINE
Payer: COMMERCIAL

## 2024-03-13 VITALS — WEIGHT: 230 LBS | HEIGHT: 67 IN | BODY MASS INDEX: 36.1 KG/M2

## 2024-03-13 DIAGNOSIS — F17.200 SMOKER: ICD-10-CM

## 2024-03-13 DIAGNOSIS — J45.41 MODERATE PERSISTENT ASTHMA WITH EXACERBATION: ICD-10-CM

## 2024-03-13 DIAGNOSIS — R05.2 SUBACUTE COUGH: ICD-10-CM

## 2024-03-13 DIAGNOSIS — F17.210 CIGARETTE SMOKER: ICD-10-CM

## 2024-03-13 DIAGNOSIS — Z87.891 PERSONAL HISTORY OF TOBACCO USE: ICD-10-CM

## 2024-03-13 LAB
DLCO %PRED: NORMAL
DLCO PRED: NORMAL
DLCO/VA %PRED: NORMAL
DLCO/VA PRED: NORMAL
DLCO/VA: NORMAL
DLCO: NORMAL
EXPIRATORY TIME: NORMAL
FEF 25-75% %PRED-PRE: NORMAL
FEF 25-75% PRED: NORMAL
FEF 25-75-PRE: NORMAL
FEV1 %PRED-PRE: NORMAL
FEV1 PRED: NORMAL
FEV1/FVC %PRED-PRE: NORMAL
FEV1/FVC PRED: NORMAL
FEV1/FVC: NORMAL
FEV1: NORMAL
FVC %PRED-PRE: NORMAL
FVC PRED: NORMAL
FVC: NORMAL
GAW %PRED: NORMAL
GAW PRED: NORMAL
GAW: NORMAL
IC PRE %PRED: NORMAL
IC PRED: NORMAL
IC: NORMAL
MVV %PRED-PRE: NORMAL
MVV PRED: NORMAL
MVV-PRE: NORMAL
PEF %PRED-PRE: NORMAL
PEF PRED: NORMAL
PEF-PRE: NORMAL
RAW %PRED: NORMAL
RAW PRED: NORMAL
RAW: NORMAL
RV PRE %PRED: NORMAL
RV PRED: NORMAL
RV: NORMAL
SVC %PRED: NORMAL
SVC PRED: NORMAL
SVC: NORMAL
TLC PRE %PRED: NORMAL
TLC PRED: NORMAL
TLC: NORMAL
VA %PRED: NORMAL
VA PRED: NORMAL
VA: NORMAL
VTG %PRED: NORMAL
VTG PRED: NORMAL
VTG: NORMAL

## 2024-03-13 PROCEDURE — 71271 CT THORAX LUNG CANCER SCR C-: CPT

## 2024-03-13 PROCEDURE — 6370000000 HC RX 637 (ALT 250 FOR IP): Performed by: FAMILY MEDICINE

## 2024-03-13 PROCEDURE — 94726 PLETHYSMOGRAPHY LUNG VOLUMES: CPT

## 2024-03-13 PROCEDURE — 94664 DEMO&/EVAL PT USE INHALER: CPT

## 2024-03-13 PROCEDURE — 94060 EVALUATION OF WHEEZING: CPT

## 2024-03-13 PROCEDURE — 94729 DIFFUSING CAPACITY: CPT

## 2024-03-13 RX ORDER — ALBUTEROL SULFATE 90 UG/1
2 AEROSOL, METERED RESPIRATORY (INHALATION) ONCE
Status: COMPLETED | OUTPATIENT
Start: 2024-03-13 | End: 2024-03-13

## 2024-03-13 RX ADMIN — ALBUTEROL SULFATE 2 PUFF: 90 AEROSOL, METERED RESPIRATORY (INHALATION) at 10:31

## 2024-03-13 NOTE — PROCEDURES
PFT Interpretation:    Obstructive Ventilatory defect of MODERATE severity is noted.  Evidence of air trapping Is noted.  Diffusion Capacity is normal.   NO Significant improvement is noted lung mechanics after bronchodilators.      Martin Joseph MD

## 2024-04-17 ENCOUNTER — OFFICE VISIT (OUTPATIENT)
Dept: NEUROLOGY | Age: 64
End: 2024-04-17
Payer: COMMERCIAL

## 2024-04-17 VITALS
HEIGHT: 67 IN | WEIGHT: 236 LBS | SYSTOLIC BLOOD PRESSURE: 148 MMHG | BODY MASS INDEX: 37.04 KG/M2 | DIASTOLIC BLOOD PRESSURE: 86 MMHG

## 2024-04-17 DIAGNOSIS — I63.02 CEREBROVASCULAR ACCIDENT (CVA) DUE TO THROMBOSIS OF BASILAR ARTERY (HCC): Primary | ICD-10-CM

## 2024-04-17 PROCEDURE — 99214 OFFICE O/P EST MOD 30 MIN: CPT | Performed by: PSYCHIATRY & NEUROLOGY
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
## 2024-07-03 ENCOUNTER — HOSPITAL ENCOUNTER (OUTPATIENT)
Age: 64
Setting detail: SPECIMEN
Discharge: HOME | End: 2024-07-03

## 2024-07-03 DIAGNOSIS — R97.20 ELEVATED PSA: ICD-10-CM

## 2024-07-03 LAB — PSA SERPL-MCNC: 8.2 NG/ML (ref 0–4)

## 2024-07-09 ENCOUNTER — OFFICE VISIT (OUTPATIENT)
Dept: UROLOGY | Age: 64
End: 2024-07-09
Payer: COMMERCIAL

## 2024-07-09 VITALS
OXYGEN SATURATION: 99 % | TEMPERATURE: 98.2 F | WEIGHT: 233.6 LBS | DIASTOLIC BLOOD PRESSURE: 74 MMHG | RESPIRATION RATE: 16 BRPM | HEIGHT: 67 IN | BODY MASS INDEX: 36.66 KG/M2 | SYSTOLIC BLOOD PRESSURE: 112 MMHG | HEART RATE: 86 BPM

## 2024-07-09 DIAGNOSIS — Z80.42 FAMILY HISTORY OF PROSTATE CANCER IN FATHER: ICD-10-CM

## 2024-07-09 DIAGNOSIS — R97.20 ELEVATED PSA: Primary | ICD-10-CM

## 2024-07-09 PROCEDURE — 99213 OFFICE O/P EST LOW 20 MIN: CPT | Performed by: NURSE PRACTITIONER

## 2024-07-09 ASSESSMENT — ENCOUNTER SYMPTOMS
EYE REDNESS: 0
CONSTIPATION: 0
DIARRHEA: 0
WHEEZING: 0
COUGH: 0
BACK PAIN: 0
VOMITING: 0
ABDOMINAL PAIN: 0
EYE PAIN: 0
NAUSEA: 0
SHORTNESS OF BREATH: 0

## 2024-09-17 RX ORDER — LOSARTAN POTASSIUM AND HYDROCHLOROTHIAZIDE 25; 100 MG/1; MG/1
1 TABLET ORAL DAILY
Qty: 90 TABLET | Refills: 1 | Status: SHIPPED | OUTPATIENT
Start: 2024-09-17

## 2024-11-04 ENCOUNTER — OFFICE VISIT (OUTPATIENT)
Dept: ORTHOPEDIC SURGERY | Age: 64
End: 2024-11-04
Payer: COMMERCIAL

## 2024-11-04 ENCOUNTER — TELEPHONE (OUTPATIENT)
Dept: ORTHOPEDIC SURGERY | Age: 64
End: 2024-11-04

## 2024-11-04 DIAGNOSIS — M25.561 PAIN IN BOTH KNEES, UNSPECIFIED CHRONICITY: Primary | ICD-10-CM

## 2024-11-04 DIAGNOSIS — M25.562 PAIN IN BOTH KNEES, UNSPECIFIED CHRONICITY: Primary | ICD-10-CM

## 2024-11-04 PROCEDURE — 99213 OFFICE O/P EST LOW 20 MIN: CPT | Performed by: ORTHOPAEDIC SURGERY

## 2024-11-04 NOTE — PROGRESS NOTES
authorization and we will get him scheduled accordingly after authorization      Provider Attestation:  I, Acosta Frazier, personally performed the services described in this documentation. All medical record entries made by the scribe were at my direction and in my presence. I have reviewed the chart and discharge instructions and agree that the records reflect my personal performance and is accurate and complete. Acosta Frazier MD. 11/04/24      Please note that this chart was generated using voice recognition Dragon dictation software.  Although every effort was made to ensure the accuracy of this automated transcription, some errors in transcription may have occurred.

## 2024-11-06 NOTE — TELEPHONE ENCOUNTER
PA started,  Investigation sent letter asking for medical notes , that I faxed to them at [phone]. Will wait for approval

## 2024-11-11 ENCOUNTER — OFFICE VISIT (OUTPATIENT)
Dept: PODIATRY | Age: 64
End: 2024-11-11
Payer: COMMERCIAL

## 2024-11-11 VITALS — BODY MASS INDEX: 36.26 KG/M2 | HEIGHT: 67 IN | WEIGHT: 231 LBS

## 2024-11-11 DIAGNOSIS — M79.672 PAIN IN LEFT FOOT: ICD-10-CM

## 2024-11-11 DIAGNOSIS — L98.9 BENIGN SKIN LESION: Primary | ICD-10-CM

## 2024-11-11 PROCEDURE — 99203 OFFICE O/P NEW LOW 30 MIN: CPT | Performed by: PODIATRIST

## 2024-11-11 PROCEDURE — 17110 DESTRUCTION B9 LES UP TO 14: CPT | Performed by: PODIATRIST

## 2024-11-11 ASSESSMENT — ENCOUNTER SYMPTOMS
BACK PAIN: 0
COLOR CHANGE: 0
DIARRHEA: 0
NAUSEA: 0
SHORTNESS OF BREATH: 0

## 2024-11-11 NOTE — PROGRESS NOTES
Demond Cabral is a 64 y.o. male who presents to the office today with chief complaint of painful callous to the left foot.  Chief Complaint   Patient presents with    Foot Pain    Callouses     Left foot callous    Symptoms began greater than 1 year(s) ago. Patient denies injury to the left foot. Patient states that the pain is greatest with pressure. Pain is rated 9 out of 10 at it's worst and is described as intermittent. Treatments prior to today's visit include: Patient was seen by another Podiatrist but was not happy with the care.      No Known Allergies    Past Medical History:   Diagnosis Date    Abnormal EKG 2004    BPH (benign prostatic hyperplasia)     Cerebral artery occlusion with cerebral infarction (HCC)     Closed left hip fracture (HCC)     Hx of fall down basement steps    Elevated blood-pressure reading without diagnosis of hypertension     Elevated PSA     History of loop recorder 12/15/2023    insertion 12/15/2023    Hypertension     Incomplete right bundle branch block     Per EKG 4-14-21    Psoriasis     Ventral hernia     Vitamin D deficiency        Prior to Admission medications    Medication Sig Start Date End Date Taking? Authorizing Provider   losartan-hydroCHLOROthiazide (HYZAAR) 100-25 MG per tablet Take 1 tablet by mouth daily 9/17/24  Yes Andi Rojas T, DO   rosuvastatin (CRESTOR) 20 MG tablet  8/18/24  Yes ProviderWaldemar MD   Cholecalciferol (VITAMIN D3) 125 MCG (5000 UT) TABS Take by mouth   Yes ProviderWaldemar MD   aspirin 81 MG EC tablet Take 1 tablet by mouth daily for 20 days 7/26/23 8/20/24  Malena Chan MD       Past Surgical History:   Procedure Laterality Date    CARDIAC CATHETERIZATION  2004    for abnormal EKG, No clots    CARDIAC PROCEDURE N/A 12/15/2023    Tito during cath case performed by Selena Andre MD at [facility] CARDIAC CATH LAB    EP DEVICE PROCEDURE N/A 12/15/2023    thai / tito/Loop recorder insert performed by Thai

## 2024-11-25 ENCOUNTER — OFFICE VISIT (OUTPATIENT)
Dept: PODIATRY | Age: 64
End: 2024-11-25
Payer: COMMERCIAL

## 2024-11-25 VITALS — BODY MASS INDEX: 36.26 KG/M2 | HEIGHT: 67 IN | WEIGHT: 231 LBS

## 2024-11-25 DIAGNOSIS — L98.9 BENIGN SKIN LESION: Primary | ICD-10-CM

## 2024-11-25 DIAGNOSIS — M79.672 PAIN IN LEFT FOOT: ICD-10-CM

## 2024-11-25 PROCEDURE — 99999 PR OFFICE/OUTPT VISIT,PROCEDURE ONLY: CPT | Performed by: PODIATRIST

## 2024-11-25 PROCEDURE — 17110 DESTRUCTION B9 LES UP TO 14: CPT | Performed by: PODIATRIST

## 2024-11-25 ASSESSMENT — ENCOUNTER SYMPTOMS
DIARRHEA: 0
COLOR CHANGE: 0
NAUSEA: 0
SHORTNESS OF BREATH: 0
BACK PAIN: 0

## 2024-11-25 NOTE — PROGRESS NOTES
[facility]  Return Patient Progress Note    Subjective: Demond Cabral 64 y.o. male that presents for follow up evaluation of painful callus left foot.  Chief Complaint   Patient presents with    Callouses     Callous trim left foot     Patient's treatment thus far has included debridement and application of phenol.  Pain is rated 2 out of 10 and is described as intermittent. Patient has been following my prescribed course of therapy as instructed.     Review of Systems   Constitutional:  Negative for activity change, appetite change, chills, diaphoresis, fatigue and fever.   Respiratory:  Negative for shortness of breath.    Cardiovascular:  Negative for leg swelling.   Gastrointestinal:  Negative for diarrhea and nausea.   Endocrine: Negative for cold intolerance, heat intolerance and polyuria.   Musculoskeletal:  Positive for arthralgias. Negative for back pain, gait problem, joint swelling and myalgias.   Skin:  Negative for color change, pallor, rash and wound.   Allergic/Immunologic: Negative for environmental allergies and food allergies.   Neurological:  Negative for dizziness, weakness, light-headedness and numbness.   Hematological:  Does not bruise/bleed easily.   Psychiatric/Behavioral:  Negative for behavioral problems, confusion and self-injury. The patient is not nervous/anxious.        Objective: Clinical evaluation of the patient reveals hyperkeratotic tissue formation to the ball of the left foot. There is pain with direct palpation of the lesion(s). Debridement of the lesion(s) with a fifteen blade does reveal a central core. The core of the lesion(s) was debrided with a fifteen blade. No signs of bacterial infection are noted to the lesion(s).  This lesion has decreased in severity since last visit.    Assessment:    Diagnosis Orders   1. Benign skin lesion  DESTRUC BENIGN LESION, UP TO 14 LESIONS      2. Pain in left foot  DESTRUC BENIGN LESION, UP TO 14 LESIONS            Plan:

## 2024-11-27 ENCOUNTER — OFFICE VISIT (OUTPATIENT)
Dept: ORTHOPEDIC SURGERY | Age: 64
End: 2024-11-27
Payer: COMMERCIAL

## 2024-11-27 VITALS — HEIGHT: 67 IN | BODY MASS INDEX: 36.26 KG/M2 | RESPIRATION RATE: 14 BRPM | WEIGHT: 231 LBS

## 2024-11-27 DIAGNOSIS — M17.0 BILATERAL PRIMARY OSTEOARTHRITIS OF KNEE: Primary | ICD-10-CM

## 2024-11-27 PROCEDURE — 20610 DRAIN/INJ JOINT/BURSA W/O US: CPT | Performed by: PHYSICIAN ASSISTANT

## 2024-11-27 RX ORDER — BUPIVACAINE HYDROCHLORIDE 2.5 MG/ML
2 INJECTION, SOLUTION INFILTRATION; PERINEURAL ONCE
Status: COMPLETED | OUTPATIENT
Start: 2024-11-27 | End: 2024-11-27

## 2024-11-27 RX ORDER — LIDOCAINE HYDROCHLORIDE 10 MG/ML
2 INJECTION, SOLUTION INFILTRATION; PERINEURAL ONCE
Status: COMPLETED | OUTPATIENT
Start: 2024-11-27 | End: 2024-11-27

## 2024-11-27 RX ADMIN — LIDOCAINE HYDROCHLORIDE 2 ML: 10 INJECTION, SOLUTION INFILTRATION; PERINEURAL at 16:49

## 2024-11-27 RX ADMIN — BUPIVACAINE HYDROCHLORIDE 5 MG: 2.5 INJECTION, SOLUTION INFILTRATION; PERINEURAL at 16:45

## 2024-11-27 RX ADMIN — BUPIVACAINE HYDROCHLORIDE 5 MG: 2.5 INJECTION, SOLUTION INFILTRATION; PERINEURAL at 16:44

## 2024-11-27 NOTE — PATIENT INSTRUCTIONS
Patient Education        Knee: Exercises  Introduction  Here are some examples of exercises for you to try. The exercises may be suggested for a condition or for rehabilitation. Start each exercise slowly. Ease off the exercises if you start to have pain.  You will be told when to start these exercises and which ones will work best for you.  How to do the exercises  Quad set    Sit or lie down on a firm surface or the floor with your affected leg straight. Place a small, rolled-up towel under your knee.  Tighten the thigh muscles of your straight leg by pressing the back of your knee down into the towel.  Hold for about 6 seconds, then rest.  Repeat 8 to 12 times.  It's a good idea to repeat these steps with your other leg.  Hip flexion (lying down, leg straight)    Lie on your back with your affected leg straight. You can bend your other leg, if that feels more comfortable.  Tighten the thigh muscles in your affected leg by pressing the back of your knee down. Hold your knee straight.  Keeping the thigh muscles tight and your leg straight, lift your affected leg up so that your heel is about 12 inches off the floor. Hold for about 6 seconds, then lower slowly.  Repeat 8 to 12 times.  It's a good idea to repeat these steps with your other leg.  Hip abduction (lying on side)    Lie on your side, with your affected leg on top. You can use your hand or a pillow to support your head.  Keep your knee straight and your leg in a straight line with your body.  Lift your affected leg straight up toward the ceiling, about 12 inches off the floor. Hold for about 6 seconds, then slowly lower your leg.  Repeat 8 to 12 times.  It's a good idea to repeat these steps on your other side.  Keep your kneecap pointing forward.  Don't let your hip drop back.  Hip extension (lying down, leg straight)    Lie on your stomach with your legs straight.  Keeping your leg straight, lift the toes of your affected leg about 6 inches off the

## 2024-11-27 NOTE — PROGRESS NOTES
function, swelling, drainage, irrigation, need for surgery and pseudo-septic inflammation, were explained to the patient. Also, discussed was the potential for further injections, irrigation and debridement and surgery. Alternate means of treatment have also been discussed with the patient.          Following an appropriate discussion with the patient regarding the risks and benefits of the procedure he consented to proceed. his bilateral knees was prepped using betadine solution and alcohol swab. Using aseptic technique and through a lateral joint line approach, his bilateral knee (s) was injected superficially with 4 cc of 1% lidocaine without epinephrine and subsequently the Durolane solution was injected intra-articularly.  A band aid was applied to the injection site. he tolerated the injection with no immediate adverse reactions.            Please note that this chart was generated using voice recognition Dragon dictation software.  Although every effort was made to ensure the accuracy of this automated transcription, some errors in transcription may have occurred.

## 2025-01-08 RX ORDER — ROSUVASTATIN CALCIUM 20 MG/1
20 TABLET, COATED ORAL DAILY
Qty: 30 TABLET | Refills: 11 | COMMUNITY
Start: 2025-01-08 | End: 2026-01-08

## 2025-01-08 RX ORDER — ROSUVASTATIN CALCIUM 20 MG/1
20 TABLET, COATED ORAL EVERY MORNING
Qty: 90 TABLET | Refills: 1 | Status: SHIPPED | OUTPATIENT
Start: 2025-01-08

## 2025-01-09 ENCOUNTER — HOSPITAL ENCOUNTER (OUTPATIENT)
Age: 65
Setting detail: SPECIMEN
Discharge: HOME | End: 2025-01-09

## 2025-01-09 DIAGNOSIS — R97.20 ELEVATED PSA: ICD-10-CM

## 2025-01-09 LAB — PSA SERPL-MCNC: 10.9 NG/ML (ref 0–4)

## 2025-01-14 ENCOUNTER — OFFICE VISIT (OUTPATIENT)
Dept: UROLOGY | Age: 65
End: 2025-01-14
Payer: COMMERCIAL

## 2025-01-14 VITALS
TEMPERATURE: 97.8 F | HEART RATE: 72 BPM | WEIGHT: 231 LBS | OXYGEN SATURATION: 93 % | HEIGHT: 67 IN | DIASTOLIC BLOOD PRESSURE: 80 MMHG | BODY MASS INDEX: 36.26 KG/M2 | SYSTOLIC BLOOD PRESSURE: 120 MMHG

## 2025-01-14 DIAGNOSIS — N40.0 BPH WITHOUT OBSTRUCTION/LOWER URINARY TRACT SYMPTOMS: ICD-10-CM

## 2025-01-14 DIAGNOSIS — Z80.42 FAMILY HISTORY OF PROSTATE CANCER IN FATHER: ICD-10-CM

## 2025-01-14 DIAGNOSIS — R97.20 ELEVATED PSA: Primary | ICD-10-CM

## 2025-01-14 PROCEDURE — 99214 OFFICE O/P EST MOD 30 MIN: CPT | Performed by: UROLOGY

## 2025-01-14 ASSESSMENT — ENCOUNTER SYMPTOMS
ALLERGIC/IMMUNOLOGIC NEGATIVE: 1
RESPIRATORY NEGATIVE: 1
BACK PAIN: 0
COLOR CHANGE: 0
WHEEZING: 0
ABDOMINAL PAIN: 0
EYES NEGATIVE: 1
SHORTNESS OF BREATH: 0
GASTROINTESTINAL NEGATIVE: 1
NAUSEA: 0
EYE REDNESS: 0
EYE PAIN: 0
COUGH: 0
VOMITING: 0

## 2025-01-14 NOTE — PROGRESS NOTES
[facility]  [address]  [facility] [address]  Dept: [phone]    [facility] Urology Office Note - Established    Patient:  Demond Cabral  YOB: 1960  Date: 1/14/2025    The patient is a 64 y.o. male who presents todayfor evaluation of the following problems:   Chief Complaint   Patient presents with    Elevated PSA      6 month PSA       HPI  He is here for BPH and elevated PSA  He has had 3 negative biopsies and 2 negative MRIs.   He has been worked up at [facility] for elevated PSA as well, which was negative.   He has known BPH.       Summary of old records: N/A    Additional History: N/A    Procedures Today: N/A    Urinalysis today:  No results found for this visit on 01/14/25.  Last several PSA's:  Lab Results   Component Value Date    PSA 10.90 (H) 01/09/2025    PSA 8.20 (H) 07/03/2024    PSA 10.01 (H) 01/16/2024     Last total testosterone:  No results found for: \"TESTOSTERONE\"    AUA Symptom Score (1/14/2025):                               Last BUN and creatinine:  Lab Results   Component Value Date    BUN 13 08/31/2023     Lab Results   Component Value Date    CREATININE 0.7 12/15/2023       Additional Lab/Culture results: none    Imaging Reviewed during this Office Visit: none  (results were independently reviewed by physician and radiology report verified)    PAST MEDICAL, FAMILY AND SOCIAL HISTORY UPDATE:  Past Medical History:   Diagnosis Date    Abnormal EKG 2004    BPH (benign prostatic hyperplasia)     Cerebral artery occlusion with cerebral infarction (HCC)     Closed left hip fracture (HCC)     Hx of fall down basement steps    Elevated blood-pressure reading without diagnosis of hypertension     Elevated PSA     History of loop recorder 12/15/2023    insertion 12/15/2023    Hypertension     Incomplete right bundle branch block     Per EKG 4-14-21    Psoriasis     Ventral hernia     Vitamin D  23-Jul-2022

## 2025-02-24 ENCOUNTER — TELEPHONE (OUTPATIENT)
Dept: ORTHOPEDIC SURGERY | Age: 65
End: 2025-02-24

## 2025-02-24 NOTE — TELEPHONE ENCOUNTER
Ubaldo crystal called asking about the PA for Durolane. Pt  keeps calling and asking about it. One was done and approved on 11/6/24 and he had the Durolane given to him on 11/27/24. Another PA needs done but Yareli has not seen pt yet. The last visit on 11/27/24 was with Brennen. Does pt need an appt with you, Yareli or can we just do the PA for another ernesto knee Durolane injection? Please advise

## 2025-02-26 NOTE — TELEPHONE ENCOUNTER
PC to pt to verify the Durolane PA was for both knees, at which time pt said he does not want them done again.

## 2025-03-11 RX ORDER — LOSARTAN POTASSIUM AND HYDROCHLOROTHIAZIDE 25; 100 MG/1; MG/1
1 TABLET ORAL DAILY
Qty: 90 TABLET | Refills: 1 | Status: SHIPPED | OUTPATIENT
Start: 2025-03-11 | End: 2025-04-22 | Stop reason: SDUPTHER

## 2025-04-11 RX ORDER — ROSUVASTATIN CALCIUM 20 MG/1
20 TABLET, COATED ORAL DAILY
Qty: 90 TABLET | Refills: 0 | Status: SHIPPED | OUTPATIENT
Start: 2025-04-11 | End: 2026-04-11

## 2025-04-19 SDOH — HEALTH STABILITY: PHYSICAL HEALTH: ON AVERAGE, HOW MANY DAYS PER WEEK DO YOU ENGAGE IN MODERATE TO STRENUOUS EXERCISE (LIKE A BRISK WALK)?: 0 DAYS

## 2025-04-22 ENCOUNTER — OFFICE VISIT (OUTPATIENT)
Age: 65
End: 2025-04-22
Payer: COMMERCIAL

## 2025-04-22 ENCOUNTER — RESULTS FOLLOW-UP (OUTPATIENT)
Age: 65
End: 2025-04-22

## 2025-04-22 ENCOUNTER — HOSPITAL ENCOUNTER (OUTPATIENT)
Age: 65
Setting detail: SPECIMEN
Discharge: HOME | End: 2025-04-22

## 2025-04-22 VITALS
BODY MASS INDEX: 36.6 KG/M2 | DIASTOLIC BLOOD PRESSURE: 88 MMHG | WEIGHT: 233.2 LBS | SYSTOLIC BLOOD PRESSURE: 134 MMHG | TEMPERATURE: 97.2 F | OXYGEN SATURATION: 96 % | HEIGHT: 67 IN | HEART RATE: 63 BPM

## 2025-04-22 DIAGNOSIS — R41.840 DIFFICULTY CONCENTRATING: ICD-10-CM

## 2025-04-22 DIAGNOSIS — M17.0 PRIMARY OSTEOARTHRITIS OF BOTH KNEES: ICD-10-CM

## 2025-04-22 DIAGNOSIS — R97.20 ELEVATED PROSTATE SPECIFIC ANTIGEN (PSA): ICD-10-CM

## 2025-04-22 DIAGNOSIS — F32.1 MAJOR DEPRESSIVE DISORDER, SINGLE EPISODE, MODERATE (HCC): ICD-10-CM

## 2025-04-22 DIAGNOSIS — M77.8 ENTHESOPATHY OF FOOT: ICD-10-CM

## 2025-04-22 DIAGNOSIS — L40.9 PSORIASIS: ICD-10-CM

## 2025-04-22 DIAGNOSIS — Z00.00 WELL ADULT EXAM: ICD-10-CM

## 2025-04-22 DIAGNOSIS — Q20.9 CARDIAC SEGMENTAL CONFIGURATION WITH ATRIAL CONFIGURATION UNKNOWN, VENTRICULAR CONFIGURATION UNKNOWN, AND INVERTED NORMALLY ALIGNED GREAT ARTERIES: ICD-10-CM

## 2025-04-22 DIAGNOSIS — M79.672 PAIN IN LEFT FOOT: ICD-10-CM

## 2025-04-22 DIAGNOSIS — K43.9 VENTRAL HERNIA WITHOUT OBSTRUCTION OR GANGRENE: ICD-10-CM

## 2025-04-22 DIAGNOSIS — I07.9 TRICUSPID VALVE DISORDER: ICD-10-CM

## 2025-04-22 DIAGNOSIS — Z87.81 S/P LEFT HIP FRACTURE: ICD-10-CM

## 2025-04-22 DIAGNOSIS — I63.9 CEREBROVASCULAR ACCIDENT (CVA), UNSPECIFIED MECHANISM (HCC): ICD-10-CM

## 2025-04-22 DIAGNOSIS — Z96.642 H/O TOTAL HIP ARTHROPLASTY, LEFT: ICD-10-CM

## 2025-04-22 DIAGNOSIS — E55.9 VITAMIN D DEFICIENCY: ICD-10-CM

## 2025-04-22 DIAGNOSIS — E78.2 MIXED HYPERLIPIDEMIA: ICD-10-CM

## 2025-04-22 DIAGNOSIS — Z45.09 ENCOUNTER FOR LOOP RECORDER CHECK: ICD-10-CM

## 2025-04-22 DIAGNOSIS — G45.9 TIA (TRANSIENT ISCHEMIC ATTACK): ICD-10-CM

## 2025-04-22 DIAGNOSIS — Q20.8 CARDIAC SEGMENTAL CONFIGURATION WITH ATRIAL CONFIGURATION UNKNOWN, VENTRICULAR CONFIGURATION UNKNOWN, AND INVERTED NORMALLY ALIGNED GREAT ARTERIES: ICD-10-CM

## 2025-04-22 DIAGNOSIS — Z87.81 S/P LEFT HIP FRACTURE: Primary | ICD-10-CM

## 2025-04-22 DIAGNOSIS — S72.002S CLOSED FRACTURE OF LEFT HIP, SEQUELA: ICD-10-CM

## 2025-04-22 DIAGNOSIS — L40.8 INVERSE PSORIASIS: ICD-10-CM

## 2025-04-22 PROBLEM — E78.5 HYPERLIPIDEMIA: Status: ACTIVE | Noted: 2023-07-28

## 2025-04-22 PROBLEM — S72.002A CLOSED LEFT HIP FRACTURE (HCC): Status: ACTIVE | Noted: 2019-03-03

## 2025-04-22 LAB
25(OH)D3 SERPL-MCNC: 32.3 NG/ML (ref 30–100)
ALBUMIN SERPL-MCNC: 3.8 G/DL (ref 3.5–5.2)
ALBUMIN/GLOB SERPL: 1.4 {RATIO} (ref 1–2.5)
ALP SERPL-CCNC: 70 U/L (ref 40–129)
ALT SERPL-CCNC: 16 U/L (ref 10–50)
ANION GAP SERPL CALCULATED.3IONS-SCNC: 7 MMOL/L (ref 9–16)
AST SERPL-CCNC: 17 U/L (ref 10–50)
BACTERIA URNS QL MICRO: NORMAL
BASOPHILS # BLD: 0.04 K/UL (ref 0–0.2)
BASOPHILS NFR BLD: 1 % (ref 0–2)
BILIRUB DIRECT SERPL-MCNC: 0.2 MG/DL (ref 0–0.2)
BILIRUB INDIRECT SERPL-MCNC: 0.1 MG/DL (ref 0–1)
BILIRUB SERPL-MCNC: 0.3 MG/DL (ref 0–1.2)
BILIRUB UR QL STRIP: NEGATIVE
BUN SERPL-MCNC: 15 MG/DL (ref 8–23)
CALCIUM SERPL-MCNC: 9.1 MG/DL (ref 8.6–10.4)
CASTS #/AREA URNS LPF: NORMAL /LPF (ref 0–8)
CHLORIDE SERPL-SCNC: 103 MMOL/L (ref 98–107)
CHOLEST SERPL-MCNC: 106 MG/DL (ref 0–199)
CHOLESTEROL/HDL RATIO: 2.9
CLARITY UR: CLEAR
CO2 SERPL-SCNC: 31 MMOL/L (ref 20–31)
COLOR UR: YELLOW
CREAT SERPL-MCNC: 0.8 MG/DL (ref 0.7–1.2)
CREAT UR-MCNC: 167 MG/DL (ref 39–259)
EOSINOPHIL # BLD: 0.2 K/UL (ref 0–0.44)
EOSINOPHILS RELATIVE PERCENT: 3 % (ref 1–4)
EPI CELLS #/AREA URNS HPF: NORMAL /HPF (ref 0–5)
ERYTHROCYTE [DISTWIDTH] IN BLOOD BY AUTOMATED COUNT: 13.2 % (ref 11.8–14.4)
GFR, ESTIMATED: >90 ML/MIN/1.73M2
GLOBULIN SER CALC-MCNC: 2.7 G/DL
GLUCOSE SERPL-MCNC: 101 MG/DL (ref 74–99)
GLUCOSE UR STRIP-MCNC: NEGATIVE MG/DL
HCT VFR BLD AUTO: 44.4 % (ref 40.7–50.3)
HCV AB SERPL QL IA: NONREACTIVE
HDLC SERPL-MCNC: 36 MG/DL
HGB BLD-MCNC: 14.5 G/DL (ref 13–17)
HGB UR QL STRIP.AUTO: ABNORMAL
IMM GRANULOCYTES # BLD AUTO: <0.03 K/UL (ref 0–0.3)
IMM GRANULOCYTES NFR BLD: 0 %
KETONES UR STRIP-MCNC: NEGATIVE MG/DL
LDLC SERPL CALC-MCNC: 62 MG/DL (ref 0–100)
LEUKOCYTE ESTERASE UR QL STRIP: NEGATIVE
LYMPHOCYTES NFR BLD: 1.85 K/UL (ref 1.1–3.7)
LYMPHOCYTES RELATIVE PERCENT: 27 % (ref 24–43)
MAGNESIUM SERPL-MCNC: 2 MG/DL (ref 1.6–2.4)
MCH RBC QN AUTO: 30.6 PG (ref 25.2–33.5)
MCHC RBC AUTO-ENTMCNC: 32.7 G/DL (ref 28.4–34.8)
MCV RBC AUTO: 93.7 FL (ref 82.6–102.9)
MICROALBUMIN UR-MCNC: <12 MG/L (ref 0–20)
MICROALBUMIN/CREAT UR-RTO: NORMAL MCG/MG CREAT (ref 0–17)
MONOCYTES NFR BLD: 0.59 K/UL (ref 0.1–1.2)
MONOCYTES NFR BLD: 9 % (ref 3–12)
NEUTROPHILS NFR BLD: 60 % (ref 36–65)
NEUTS SEG NFR BLD: 4.18 K/UL (ref 1.5–8.1)
NITRITE UR QL STRIP: NEGATIVE
NRBC BLD-RTO: 0 PER 100 WBC
PH UR STRIP: 6 [PH] (ref 5–8)
PLATELET # BLD AUTO: 180 K/UL (ref 138–453)
PMV BLD AUTO: 12 FL (ref 8.1–13.5)
POTASSIUM SERPL-SCNC: 4.1 MMOL/L (ref 3.7–5.3)
PROT SERPL-MCNC: 6.5 G/DL (ref 6.6–8.7)
PROT UR STRIP-MCNC: NEGATIVE MG/DL
RBC # BLD AUTO: 4.74 M/UL (ref 4.21–5.77)
RBC #/AREA URNS HPF: NORMAL /HPF (ref 0–4)
SODIUM SERPL-SCNC: 141 MMOL/L (ref 136–145)
SP GR UR STRIP: 1.02 (ref 1–1.03)
TRIGL SERPL-MCNC: 39 MG/DL (ref 0–149)
TSH SERPL DL<=0.05 MIU/L-ACNC: 1.4 UIU/ML (ref 0.27–4.2)
URATE SERPL-MCNC: 4.3 MG/DL (ref 3.4–7)
UROBILINOGEN UR STRIP-ACNC: NORMAL EU/DL (ref 0–1)
VLDLC SERPL CALC-MCNC: 8 MG/DL (ref 1–30)
WBC #/AREA URNS HPF: NORMAL /HPF (ref 0–5)
WBC OTHER # BLD: 6.9 K/UL (ref 3.5–11.3)

## 2025-04-22 PROCEDURE — 99396 PREV VISIT EST AGE 40-64: CPT | Performed by: FAMILY MEDICINE

## 2025-04-22 RX ORDER — LOSARTAN POTASSIUM AND HYDROCHLOROTHIAZIDE 25; 100 MG/1; MG/1
1 TABLET ORAL DAILY
Qty: 90 TABLET | Refills: 1 | Status: SHIPPED | OUTPATIENT
Start: 2025-04-22

## 2025-04-22 RX ORDER — ROSUVASTATIN CALCIUM 20 MG/1
20 TABLET, COATED ORAL DAILY
Qty: 90 TABLET | Refills: 1 | Status: SHIPPED | OUTPATIENT
Start: 2025-04-22 | End: 2026-04-22

## 2025-04-22 SDOH — ECONOMIC STABILITY: FOOD INSECURITY: WITHIN THE PAST 12 MONTHS, YOU WORRIED THAT YOUR FOOD WOULD RUN OUT BEFORE YOU GOT MONEY TO BUY MORE.: NEVER TRUE

## 2025-04-22 SDOH — ECONOMIC STABILITY: FOOD INSECURITY: WITHIN THE PAST 12 MONTHS, THE FOOD YOU BOUGHT JUST DIDN'T LAST AND YOU DIDN'T HAVE MONEY TO GET MORE.: NEVER TRUE

## 2025-04-22 ASSESSMENT — PATIENT HEALTH QUESTIONNAIRE - PHQ9
SUM OF ALL RESPONSES TO PHQ QUESTIONS 1-9: 0
2. FEELING DOWN, DEPRESSED OR HOPELESS: NOT AT ALL
SUM OF ALL RESPONSES TO PHQ QUESTIONS 1-9: 0
SUM OF ALL RESPONSES TO PHQ QUESTIONS 1-9: 0
1. LITTLE INTEREST OR PLEASURE IN DOING THINGS: NOT AT ALL
SUM OF ALL RESPONSES TO PHQ QUESTIONS 1-9: 0

## 2025-04-22 NOTE — PROGRESS NOTES
Hydroxy; Future  12. Elevated prostate specific antigen (PSA)  -     Albumin/Creatinine Ratio, Urine; Future  -     Basic Metabolic Panel; Future  -     CBC with Auto Differential; Future  -     Hepatitis C Antibody; Future  -     Lipid, Fasting; Future  -     Hepatic Function Panel; Future  -     Magnesium; Future  -     TSH reflex to FT4; Future  -     Uric Acid; Future  -     Urinalysis with Reflex to Culture; Future  -     Vitamin D 25 Hydroxy; Future  13. Encounter for loop recorder check  -     Albumin/Creatinine Ratio, Urine; Future  -     Basic Metabolic Panel; Future  -     CBC with Auto Differential; Future  -     Hepatitis C Antibody; Future  -     Lipid, Fasting; Future  -     Hepatic Function Panel; Future  -     Magnesium; Future  -     TSH reflex to FT4; Future  -     Uric Acid; Future  -     Urinalysis with Reflex to Culture; Future  -     Vitamin D 25 Hydroxy; Future  14. H/O total hip arthroplasty, left  15. Mixed hyperlipidemia  -     Albumin/Creatinine Ratio, Urine; Future  -     Basic Metabolic Panel; Future  -     CBC with Auto Differential; Future  -     Hepatitis C Antibody; Future  -     Lipid, Fasting; Future  -     Hepatic Function Panel; Future  -     Magnesium; Future  -     TSH reflex to FT4; Future  -     Uric Acid; Future  -     Urinalysis with Reflex to Culture; Future  -     Vitamin D 25 Hydroxy; Future  16. Major depressive disorder, single episode, moderate (HCC)  -     Albumin/Creatinine Ratio, Urine; Future  -     Basic Metabolic Panel; Future  -     CBC with Auto Differential; Future  -     Hepatitis C Antibody; Future  -     Lipid, Fasting; Future  -     Hepatic Function Panel; Future  -     Magnesium; Future  -     TSH reflex to FT4; Future  -     Uric Acid; Future  -     Urinalysis with Reflex to Culture; Future  -     Vitamin D 25 Hydroxy; Future  17. Pain in left foot  18. Vitamin D deficiency  -     Albumin/Creatinine Ratio, Urine; Future  -     Basic Metabolic Panel;

## 2025-07-07 ENCOUNTER — HOSPITAL ENCOUNTER (OUTPATIENT)
Age: 65
Setting detail: SPECIMEN
Discharge: HOME | End: 2025-07-07

## 2025-07-07 DIAGNOSIS — R97.20 ELEVATED PSA: ICD-10-CM

## 2025-07-07 LAB — PSA SERPL-MCNC: 10.2 NG/ML (ref 0–4)

## 2025-07-15 ENCOUNTER — OFFICE VISIT (OUTPATIENT)
Dept: UROLOGY | Age: 65
End: 2025-07-15
Payer: COMMERCIAL

## 2025-07-15 VITALS
HEART RATE: 73 BPM | WEIGHT: 228 LBS | BODY MASS INDEX: 35.71 KG/M2 | RESPIRATION RATE: 12 BRPM | DIASTOLIC BLOOD PRESSURE: 72 MMHG | SYSTOLIC BLOOD PRESSURE: 106 MMHG

## 2025-07-15 DIAGNOSIS — R97.20 ELEVATED PSA: Primary | ICD-10-CM

## 2025-07-15 DIAGNOSIS — Z80.42 FAMILY HISTORY OF PROSTATE CANCER IN FATHER: ICD-10-CM

## 2025-07-15 PROCEDURE — 99212 OFFICE O/P EST SF 10 MIN: CPT | Performed by: UROLOGY

## 2025-07-15 ASSESSMENT — ENCOUNTER SYMPTOMS
ABDOMINAL PAIN: 0
DIARRHEA: 0
CONSTIPATION: 0
BLOOD IN STOOL: 0
NAUSEA: 0
VOMITING: 0

## 2025-07-15 NOTE — PROGRESS NOTES
[facility]  [address]  [facility] [address]  Dept: [phone]    [facility] Urology Office Note - Established    Patient:  Demond Cabral  YOB: 1960  Date: 7/15/2025    The patient is a 64 y.o. male who presents todayfor evaluation of the following problems:   Chief Complaint   Patient presents with    Elevated PSA     OFFICE VISIT - 6 month PSA (Elevated PSA/BPH without obstruction/LUTS)         HPI  Here for elevated PSA.   He had 3 negative biopsies, including a fusion biopsy at [facility].   PSA did trend down to 10.2.       Summary of old records: N/A    Additional History: N/A    Procedures Today: N/A    Urinalysis today:  No results found for this visit on 07/15/25.  Last several PSA's:  Lab Results   Component Value Date    PSA 10.20 (H) 07/07/2025    PSA 10.90 (H) 01/09/2025    PSA 8.20 (H) 07/03/2024     Last total testosterone:  No results found for: \"TESTOSTERONE\"    AUA Symptom Score (7/15/2025):                               Last BUN and creatinine:  Lab Results   Component Value Date    BUN 15 04/22/2025     Lab Results   Component Value Date    CREATININE 0.8 04/22/2025       Additional Lab/Culture results: none    Imaging Reviewed during this Office Visit: none  (results were independently reviewed by physician and radiology report verified)    PAST MEDICAL, FAMILY AND SOCIAL HISTORY UPDATE:  Past Medical History:   Diagnosis Date    Abnormal EKG 2004    BPH (benign prostatic hyperplasia)     Cerebral artery occlusion with cerebral infarction (HCC)     Closed left hip fracture (HCC)     Hx of fall down basement steps    Elevated blood-pressure reading without diagnosis of hypertension     Elevated PSA     History of loop recorder 12/15/2023    insertion 12/15/2023    Hypertension     Incomplete right bundle branch block     Per EKG 4-14-21    Psoriasis     Ventral hernia     Vitamin D deficiency

## 2025-07-15 NOTE — PROGRESS NOTES
Review of Systems   Cardiovascular:  Negative for chest pain and leg swelling.   Gastrointestinal:  Negative for abdominal pain, blood in stool, constipation, diarrhea, nausea and vomiting.   Genitourinary:  Negative for dysuria.   Hematological:  Does not bruise/bleed easily.